# Patient Record
Sex: MALE | Race: WHITE | Employment: OTHER | URBAN - METROPOLITAN AREA
[De-identification: names, ages, dates, MRNs, and addresses within clinical notes are randomized per-mention and may not be internally consistent; named-entity substitution may affect disease eponyms.]

---

## 2017-07-28 ENCOUNTER — HOSPITAL ENCOUNTER (OUTPATIENT)
Dept: PREADMISSION TESTING | Age: 67
Discharge: HOME | End: 2017-07-28
Payer: MEDICARE

## 2017-07-28 VITALS
BODY MASS INDEX: 25.18 KG/M2 | DIASTOLIC BLOOD PRESSURE: 67 MMHG | TEMPERATURE: 96.5 F | HEART RATE: 64 BPM | OXYGEN SATURATION: 96 % | WEIGHT: 170 LBS | HEIGHT: 69 IN | RESPIRATION RATE: 16 BRPM | SYSTOLIC BLOOD PRESSURE: 121 MMHG

## 2017-07-28 LAB
ABO + RH BLD: NORMAL
ALBUMIN SERPL BCP-MCNC: 3.2 G/DL (ref 3.5–5)
ALBUMIN/GLOB SERPL: 1.1 {RATIO} (ref 1.1–2.2)
ALP SERPL-CCNC: 63 U/L (ref 45–117)
ALT SERPL-CCNC: 57 U/L (ref 12–78)
ANION GAP BLD CALC-SCNC: 6 MMOL/L (ref 5–15)
APPEARANCE UR: CLEAR
APTT PPP: 28.2 SEC (ref 22.1–32.5)
AST SERPL W P-5'-P-CCNC: 57 U/L (ref 15–37)
ATRIAL RATE: 54 BPM
BACTERIA URNS QL MICRO: NEGATIVE /HPF
BASOPHILS # BLD AUTO: 0 K/UL (ref 0–0.1)
BASOPHILS # BLD: 0 % (ref 0–1)
BILIRUB SERPL-MCNC: 0.4 MG/DL (ref 0.2–1)
BILIRUB UR QL: NEGATIVE
BLOOD GROUP ANTIBODIES SERPL: NORMAL
BUN SERPL-MCNC: 18 MG/DL (ref 6–20)
BUN/CREAT SERPL: 23 (ref 12–20)
CALCIUM SERPL-MCNC: 8.9 MG/DL (ref 8.5–10.1)
CALCULATED P AXIS, ECG09: 74 DEGREES
CALCULATED R AXIS, ECG10: -8 DEGREES
CALCULATED T AXIS, ECG11: 42 DEGREES
CHLORIDE SERPL-SCNC: 102 MMOL/L (ref 97–108)
CO2 SERPL-SCNC: 30 MMOL/L (ref 21–32)
COLOR UR: ABNORMAL
CREAT SERPL-MCNC: 0.79 MG/DL (ref 0.7–1.3)
DIAGNOSIS, 93000: NORMAL
EOSINOPHIL # BLD: 0.4 K/UL (ref 0–0.4)
EOSINOPHIL NFR BLD: 5 % (ref 0–7)
EPITH CASTS URNS QL MICRO: ABNORMAL /LPF
ERYTHROCYTE [DISTWIDTH] IN BLOOD BY AUTOMATED COUNT: 13.3 % (ref 11.5–14.5)
EST. AVERAGE GLUCOSE BLD GHB EST-MCNC: 108 MG/DL
GLOBULIN SER CALC-MCNC: 2.9 G/DL (ref 2–4)
GLUCOSE SERPL-MCNC: 93 MG/DL (ref 65–100)
GLUCOSE UR STRIP.AUTO-MCNC: NEGATIVE MG/DL
HBA1C MFR BLD: 5.4 % (ref 4.2–6.3)
HCT VFR BLD AUTO: 41.7 % (ref 36.6–50.3)
HGB BLD-MCNC: 14.5 G/DL (ref 12.1–17)
HGB UR QL STRIP: NEGATIVE
HYALINE CASTS URNS QL MICRO: ABNORMAL /LPF (ref 0–5)
INR PPP: 1.1 (ref 0.9–1.1)
KETONES UR QL STRIP.AUTO: NEGATIVE MG/DL
LEUKOCYTE ESTERASE UR QL STRIP.AUTO: NEGATIVE
LYMPHOCYTES # BLD AUTO: 22 % (ref 12–49)
LYMPHOCYTES # BLD: 1.5 K/UL (ref 0.8–3.5)
MCH RBC QN AUTO: 32.2 PG (ref 26–34)
MCHC RBC AUTO-ENTMCNC: 34.8 G/DL (ref 30–36.5)
MCV RBC AUTO: 92.7 FL (ref 80–99)
MONOCYTES # BLD: 0.7 K/UL (ref 0–1)
MONOCYTES NFR BLD AUTO: 11 % (ref 5–13)
NEUTS SEG # BLD: 4.3 K/UL (ref 1.8–8)
NEUTS SEG NFR BLD AUTO: 62 % (ref 32–75)
NITRITE UR QL STRIP.AUTO: NEGATIVE
P-R INTERVAL, ECG05: 224 MS
PH UR STRIP: 7 [PH] (ref 5–8)
PLATELET # BLD AUTO: 217 K/UL (ref 150–400)
POTASSIUM SERPL-SCNC: 4.5 MMOL/L (ref 3.5–5.1)
PROT SERPL-MCNC: 6.1 G/DL (ref 6.4–8.2)
PROT UR STRIP-MCNC: 100 MG/DL
PROTHROMBIN TIME: 10.6 SEC (ref 9–11.1)
Q-T INTERVAL, ECG07: 466 MS
QRS DURATION, ECG06: 100 MS
QTC CALCULATION (BEZET), ECG08: 441 MS
RBC # BLD AUTO: 4.5 M/UL (ref 4.1–5.7)
RBC #/AREA URNS HPF: ABNORMAL /HPF (ref 0–5)
SODIUM SERPL-SCNC: 138 MMOL/L (ref 136–145)
SP GR UR REFRACTOMETRY: 1.01 (ref 1–1.03)
SPECIMEN EXP DATE BLD: NORMAL
THERAPEUTIC RANGE,PTTT: NORMAL SECS (ref 58–77)
UA: UC IF INDICATED,UAUC: ABNORMAL
UROBILINOGEN UR QL STRIP.AUTO: 0.2 EU/DL (ref 0.2–1)
VENTRICULAR RATE, ECG03: 54 BPM
WBC # BLD AUTO: 6.9 K/UL (ref 4.1–11.1)
WBC URNS QL MICRO: ABNORMAL /HPF (ref 0–4)

## 2017-07-28 PROCEDURE — 83036 HEMOGLOBIN GLYCOSYLATED A1C: CPT | Performed by: ORTHOPAEDIC SURGERY

## 2017-07-28 PROCEDURE — 85730 THROMBOPLASTIN TIME PARTIAL: CPT | Performed by: ORTHOPAEDIC SURGERY

## 2017-07-28 PROCEDURE — 86900 BLOOD TYPING SEROLOGIC ABO: CPT | Performed by: ORTHOPAEDIC SURGERY

## 2017-07-28 PROCEDURE — 36415 COLL VENOUS BLD VENIPUNCTURE: CPT | Performed by: ORTHOPAEDIC SURGERY

## 2017-07-28 PROCEDURE — 80053 COMPREHEN METABOLIC PANEL: CPT | Performed by: ORTHOPAEDIC SURGERY

## 2017-07-28 PROCEDURE — 93005 ELECTROCARDIOGRAM TRACING: CPT

## 2017-07-28 PROCEDURE — 85025 COMPLETE CBC W/AUTO DIFF WBC: CPT | Performed by: ORTHOPAEDIC SURGERY

## 2017-07-28 PROCEDURE — 81001 URINALYSIS AUTO W/SCOPE: CPT | Performed by: ORTHOPAEDIC SURGERY

## 2017-07-28 PROCEDURE — 85610 PROTHROMBIN TIME: CPT | Performed by: ORTHOPAEDIC SURGERY

## 2017-07-28 RX ORDER — INDAPAMIDE 1.25 MG/1
TABLET, FILM COATED ORAL
COMMUNITY

## 2017-07-28 RX ORDER — EZETIMIBE 10 MG/1
TABLET ORAL
COMMUNITY

## 2017-07-28 RX ORDER — NEBIVOLOL 5 MG/1
TABLET ORAL
COMMUNITY

## 2017-07-28 NOTE — PERIOP NOTES
[facility]  PREOPERATIVE INSTRUCTIONS    Surgery Date:   8/21/17      Surgery arrival time given by surgeon: NO  (If [facility] staff will call you between 4pm - 8pm the day before surgery with your arrival time. If your surgery is on a Monday, we will call you the preceding Friday. Please call [phone] after 7pm if you did not receive your arrival time.)  1. Report  to the 2nd Floor Admitting Desk at the time you were told the night before surgery. Bring your insurance card, photo identification, and any copayment (if applicable). 2. You must have a responsible adult to drive you home and stay with you the first 24 hours after surgery if you are going home the same day of your surgery. 3. Nothing to eat or drink after midnight the night before surgery. This means NO water, gum, mints, coffee, juice, etc.    4. MEDICATIONS TO TAKE THE MORNING OF SURGERY WITH A SIP OF WATER:Bystolic  5. No alcoholic beverages 24 hours before and after your surgery. 6. If you are being admitted to the hospital,please leave personal belongings/luggage in your car until you have an assigned hospital room number. ( The hospital discharge time is 12 PM NOON. Your adult  should be at the hospital prior to the noon discharge time unless otherwise instructed.)   7. STOP Aspirin and/or any non-steroidal anti-inflammatory drugs (i.e. Ibuprofen, Naproxen, Advil, Aleve) as directed by your surgeon. You may take Tylenol. Stop herbal supplements 1 week prior to  surgery. 8. If you are currently taking Plavix, Coumadin,or any other blood-thinning/ anticoagulant medication contact your surgeon for instructions. 9. Wear comfortable clothes. Wear your glasses instead of contacts. Please leave all money, jewelry and valuables at home. No make up, particularly mascara, the day of surgery. 10.  REMOVE ALL body piercings, rings,and jewelry and leave at home. Wear your hair loose or down, no pony-tails, buns, or any metal hair clips.    11. If you shower the morning of surgery, please do not apply any lotions, powders, or deodorants afterwards. Do not shave any body area within 24 hours of your surgery. 12. Please follow all instructions to avoid any potential surgical cancellation. 13. Should your physical condition change, (i.e. fever, cold, flu, etc.) please notify your surgeon as soon as possible. 14. It is important to be on time. If a situation occurs where you may be delayed, please call:  [phone] /  on the day of surgery. 15. The Preadmission Testing staff can be reached at 21 [phone]. 15.  Special Instructions:  · Use Chlorhexidine Care Fusion wash and sponges 3 days prior to surgery as instructed. · Incentive spirometer given with instructions to practice at home and bring back to the hospital on the day of surgery. · [facility] will contact you if your Hemoglobin A1C is greater than 7.5. · Ensure/Glucerna  sample, nutritional information, and Ensure/Glucerna coupon given. · Pain pamphlet and Call Don't Fall reminder reviewed with patient. ·  parking is complimentary Monday - Friday 7 am - 5 pm  · Bring PTA Medication list day of surgery with the last doses taken documented   · Do not bring medication bottles the day of surgery  · Bring back brace  16. The patient was contacted  in person. He  demonstrate  understanding of all instructions does not  need reinforcement.

## 2017-07-28 NOTE — H&P
PAT Pre-Op History & Physical    Patient: Mo Chen                  MRN: [id number]          SSN: [id number]  YOB: 1950          Age: 75 y.o. Sex: male                Subjective:   Patient is a 75 y.o.  male who presents with history of chronic lower back pain that he states has been a slight problem for \"years\" but escalated significantly about 2 years ago . States at that time he began having pain in his lower back that he thought was related to his hips. Was ultimately referred to another orthopedic spine specialist and received multiple KENDAL without relief. Rates his lower back pain 2-5/10 and states that it radiates down the back of both legs to the level of his calves. Also c/o numbness in his left heel and some weakness in his left leg. Describes his pain as a constant dull ache. In addition to the [facility] SERVICES he has failed NSAIDs, narcotic pain medication, and PT. The patient was evaluated in the surgeon's office and it was determined that the most appropriate plan of care is to proceed with surgical intervention. Patient's PCP Deysi Cano MD        Past Medical History:   Diagnosis Date    Aneurysm ([address]     small AAA (2.7 cm) - followed by Dr. Lee Section Arthritis     Chronic pain     lower back    Gout     Hypertension     Joint pain 9/4/13    Musculoskeletal disorder     Other ill-defined conditions     Inguinal hernia    Other ill-defined conditions     hypercholesterolemia      Past Surgical History:   Procedure Laterality Date    HX HEENT  1986    septoplasty    HX HERNIA REPAIR Right 09/09/2013    inguinal with mesh    HX SEPTOPLASTY  1998    HX VASECTOMY  1989      Prior to Admission medications    Medication Sig Start Date End Date Taking? Authorizing Provider   ezetimibe (ZETIA) 10 mg tablet Take  by mouth daily (after breakfast). Yes Historical Provider   nebivolol (BYSTOLIC) 5 mg tablet Take  by mouth daily (after breakfast).    Yes Historical Provider   indapamide (LOZOL) 1.25 mg tablet Take  by mouth every morning. Yes Historical Provider   atorvastatin (LIPITOR) 40 mg tablet Take  by mouth nightly. Yes Historical Provider   allopurinol (ZYLOPRIM) 300 mg tablet Take  by mouth nightly. Yes Historical Provider   losartan (COZAAR) 100 mg tablet Take 100 mg by mouth daily. AT BEDTIME   Yes Historical Provider   diclofenac EC (VOLTAREN) 75 mg EC tablet Take  by mouth two (2) times daily as needed. Yes Historical Provider   aspirin delayed-release 81 mg tablet Take  by mouth two (2) times a day. Yes Historical Provider     Current Outpatient Prescriptions   Medication Sig    ezetimibe (ZETIA) 10 mg tablet Take  by mouth daily (after breakfast).  nebivolol (BYSTOLIC) 5 mg tablet Take  by mouth daily (after breakfast).  indapamide (LOZOL) 1.25 mg tablet Take  by mouth every morning.  atorvastatin (LIPITOR) 40 mg tablet Take  by mouth nightly.  allopurinol (ZYLOPRIM) 300 mg tablet Take  by mouth nightly.  losartan (COZAAR) 100 mg tablet Take 100 mg by mouth daily. AT BEDTIME    diclofenac EC (VOLTAREN) 75 mg EC tablet Take  by mouth two (2) times daily as needed.  aspirin delayed-release 81 mg tablet Take  by mouth two (2) times a day. No current facility-administered medications for this encounter.        Allergies   Allergen Reactions    Sulfa (Sulfonamide Antibiotics) Hives     Told this by his mother      Social History   Substance Use Topics    Smoking status: Former Smoker     Packs/day: 1.00     Years: 10.00     Quit date: 1986    Smokeless tobacco: Never Used    Alcohol use 1.2 oz/week     2 Glasses of wine per week      History   Drug Use No     Family History   Problem Relation Age of Onset    Cancer Mother      breast, colon    Other Mother      vascular disease- 5 stents- renal/femoral    Heart Surgery Mother      repair congenital heart defect    Hypertension Mother     High Cholesterol Mother  Cancer Father      colon    Heart Disease Father      MI x2 ()    Hypertension Father     Breast Cancer Sister     Hypertension Brother     Diabetes Brother      borderline         Review of Systems    Patient denies difficulty swallowing, mouth sores, or loose teeth. Patient denies any recent dental procedures or any planned prior to surgery. Patient denies chest pain, tightness, pain radiating down left arm, palpitations. Denies dizziness, visual disturbances, or lightheadedness. Patient denies shortness of breath, wheezing, cough, fever, or chills. Patient denies diarrhea, constipation, or abdominal pain. Patient denies urinary problems including dysuria, hesitancy, urgency, or incontinence. Denies skin breakdown, rashes, insect bites or open area. C/o lower back pain. Objective:     Patient Vitals for the past 24 hrs:   Temp Pulse Resp BP SpO2   17 0908 96.5 °F (35.8 °C) 64 16 121/67 96 %     Temp (24hrs), Av.5 °F (35.8 °C), Min:96.5 °F (35.8 °C), Max:96.5 °F (35.8 °C)    Body mass index is 25.1 kg/(m^2). Wt Readings from Last 1 Encounters:   17 77.1 kg (170 lb)        Physical Exam:     General: Pleasant,  cooperative, no apparent distress, appears stated age. Eyes: Conjunctivae/corneas clear. EOMs intact. Nose: Nares normal.   Mouth/Throat: Lips, mucosa, and tongue normal. Teeth and gums normal.   Neck: Supple, symmetrical, trachea midline. Back: Symmetric   Lungs: Clear to auscultation bilaterally. Heart: Regular rate and rhythm, S1, S2 normal. No murmur, click, rub or gallop. Abdomen: Soft, non-tender. Bowel sounds normal. No distention. Musculoskeletal:  Slight LLE weakness noted. Extremities:  Extremities normal, atraumatic, no cyanosis or edema. Calves                                 supple, non tender to palpation. Pulses: 2+ and symmetric bilateral upper extremities. Cap.  refill <2 seconds   Skin: Skin color, texture, turgor normal.  No rashes or lesions. Neurologic: CN II-XII grossly intact. Alert and oriented x3. Labs:   Recent Results (from the past 72 hour(s))   CBC WITH AUTOMATED DIFF    Collection Time: 07/28/17  9:55 AM   Result Value Ref Range    WBC 6.9 4.1 - 11.1 K/uL    RBC 4.50 4. 10 - 5.70 M/uL    HGB 14.5 12.1 - 17.0 g/dL    HCT 41.7 36.6 - 50.3 %    MCV 92.7 80.0 - 99.0 FL    MCH 32.2 26.0 - 34.0 PG    MCHC 34.8 30.0 - 36.5 g/dL    RDW 13.3 11.5 - 14.5 %    PLATELET 020 991 - 278 K/uL    NEUTROPHILS 62 32 - 75 %    LYMPHOCYTES 22 12 - 49 %    MONOCYTES 11 5 - 13 %    EOSINOPHILS 5 0 - 7 %    BASOPHILS 0 0 - 1 %    ABS. NEUTROPHILS 4.3 1.8 - 8.0 K/UL    ABS. LYMPHOCYTES 1.5 0.8 - 3.5 K/UL    ABS. MONOCYTES 0.7 0.0 - 1.0 K/UL    ABS. EOSINOPHILS 0.4 0.0 - 0.4 K/UL    ABS. BASOPHILS 0.0 0.0 - 0.1 K/UL   METABOLIC PANEL, COMPREHENSIVE    Collection Time: 07/28/17  9:55 AM   Result Value Ref Range    Sodium 138 136 - 145 mmol/L    Potassium 4.5 3.5 - 5.1 mmol/L    Chloride 102 97 - 108 mmol/L    CO2 30 21 - 32 mmol/L    Anion gap 6 5 - 15 mmol/L    Glucose 93 65 - 100 mg/dL    BUN 18 6 - 20 MG/DL    Creatinine 0.79 0.70 - 1.30 MG/DL    BUN/Creatinine ratio 23 (H) 12 - 20      GFR est AA >60 >60 ml/min/1.73m2    GFR est non-AA >60 >60 ml/min/1.73m2    Calcium 8.9 8.5 - 10.1 MG/DL    Bilirubin, total 0.4 0.2 - 1.0 MG/DL    ALT (SGPT) 57 12 - 78 U/L    AST (SGOT) 57 (H) 15 - 37 U/L    Alk.  phosphatase 63 45 - 117 U/L    Protein, total 6.1 (L) 6.4 - 8.2 g/dL    Albumin 3.2 (L) 3.5 - 5.0 g/dL    Globulin 2.9 2.0 - 4.0 g/dL    A-G Ratio 1.1 1.1 - 2.2     HEMOGLOBIN A1C WITH EAG    Collection Time: 07/28/17  9:55 AM   Result Value Ref Range    Hemoglobin A1c 5.4 4.2 - 6.3 %    Est. average glucose 108 mg/dL   PROTHROMBIN TIME + INR    Collection Time: 07/28/17  9:55 AM   Result Value Ref Range    INR 1.1 0.9 - 1.1      Prothrombin time 10.6 9.0 - 11.1 sec   PTT    Collection Time: 07/28/17  9:55 AM   Result Value Ref Range    aPTT 28.2 22.1 - 32.5 sec    aPTT, therapeutic range     58.0 - 77.0 SECS   URINALYSIS W/ REFLEX CULTURE    Collection Time: 07/28/17  9:55 AM   Result Value Ref Range    Color YELLOW/STRAW      Appearance CLEAR CLEAR      Specific gravity 1.010 1.003 - 1.030      pH (UA) 7.0 5.0 - 8.0      Protein 100 (A) NEG mg/dL    Glucose NEGATIVE  NEG mg/dL    Ketone NEGATIVE  NEG mg/dL    Bilirubin NEGATIVE  NEG      Blood NEGATIVE  NEG      Urobilinogen 0.2 0.2 - 1.0 EU/dL    Nitrites NEGATIVE  NEG      Leukocyte Esterase NEGATIVE  NEG      WBC 0-4 0 - 4 /hpf    RBC 0-5 0 - 5 /hpf    Epithelial cells FEW FEW /lpf    Bacteria NEGATIVE  NEG /hpf    UA:UC IF INDICATED CULTURE NOT INDICATED BY UA RESULT CNI      Hyaline cast 0-2 0 - 5 /lpf   TYPE & SCREEN    Collection Time: 07/28/17  9:55 AM   Result Value Ref Range    Crossmatch Expiration 07/31/2017     ABO/Rh(D) O NEGATIVE     Antibody screen NEG    EKG, 12 LEAD, INITIAL    Collection Time: 07/28/17 10:46 AM   Result Value Ref Range    Ventricular Rate 54 BPM    Atrial Rate 54 BPM    P-R Interval 224 ms    QRS Duration 100 ms    Q-T Interval 466 ms    QTC Calculation (Bezet) 441 ms    Calculated P Axis 74 degrees    Calculated R Axis -8 degrees    Calculated T Axis 42 degrees    Diagnosis       Sinus bradycardia with 1st degree AV block  Otherwise normal ECG  When compared with ECG of 09-SEP-2013 10:56,  No significant change was found  Confirmed by Kyra Garcia MD., Deandra Ordaz ([id number]) on 7/28/2017 3:15:19 PM         Assessment:     Lumbar stenosis    Plan:     Scheduled for L3- L4 laminectomy, fusion, instrumentation, TLIF, bone graft. Labs and EKG done per surgeon's orders. LAb results and EKG reviewed- unremarkable. MRSA pending. Copies of last office notes received from PCP (Dr. Efren Tracey), Cardiology (Dr. Otilio Phipps), and Vascular ( Dr. Melissa Corea)- reviewed and placed on chart.     Dina Jiang NP

## 2017-07-29 LAB
BACTERIA SPEC CULT: NORMAL
BACTERIA SPEC CULT: NORMAL
SERVICE CMNT-IMP: NORMAL

## 2017-08-18 ENCOUNTER — ANESTHESIA EVENT (OUTPATIENT)
Dept: SURGERY | Age: 67
DRG: 460 | End: 2017-08-18
Payer: MEDICARE

## 2017-08-21 ENCOUNTER — APPOINTMENT (OUTPATIENT)
Dept: GENERAL RADIOLOGY | Age: 67
DRG: 460 | End: 2017-08-21
Attending: ORTHOPAEDIC SURGERY
Payer: MEDICARE

## 2017-08-21 ENCOUNTER — HOSPITAL ENCOUNTER (INPATIENT)
Age: 67
LOS: 4 days | Discharge: HOME HEALTH | DRG: 460 | End: 2017-08-25
Attending: ORTHOPAEDIC SURGERY | Admitting: ORTHOPAEDIC SURGERY
Payer: MEDICARE

## 2017-08-21 ENCOUNTER — ANESTHESIA (OUTPATIENT)
Dept: SURGERY | Age: 67
DRG: 460 | End: 2017-08-21
Payer: MEDICARE

## 2017-08-21 PROBLEM — M48.062 LUMBAR STENOSIS WITH NEUROGENIC CLAUDICATION: Status: ACTIVE | Noted: 2017-08-21

## 2017-08-21 LAB
ABO + RH BLD: NORMAL
BLOOD GROUP ANTIBODIES SERPL: NORMAL
SPECIMEN EXP DATE BLD: NORMAL

## 2017-08-21 PROCEDURE — 77030008771 HC TU NG SALEM SUMP -A: Performed by: NURSE ANESTHETIST, CERTIFIED REGISTERED

## 2017-08-21 PROCEDURE — 36415 COLL VENOUS BLD VENIPUNCTURE: CPT | Performed by: ORTHOPAEDIC SURGERY

## 2017-08-21 PROCEDURE — C1713 ANCHOR/SCREW BN/BN,TIS/BN: HCPCS | Performed by: ORTHOPAEDIC SURGERY

## 2017-08-21 PROCEDURE — 74011250636 HC RX REV CODE- 250/636: Performed by: ANESTHESIOLOGY

## 2017-08-21 PROCEDURE — 01NB0ZZ RELEASE LUMBAR NERVE, OPEN APPROACH: ICD-10-PCS | Performed by: ORTHOPAEDIC SURGERY

## 2017-08-21 PROCEDURE — 74011250636 HC RX REV CODE- 250/636

## 2017-08-21 PROCEDURE — 77030003161 HC GRFT DURA MTRX INLC -E: Performed by: ORTHOPAEDIC SURGERY

## 2017-08-21 PROCEDURE — 77030037134 HC WRAP COMPR BACK THER SOLM -B

## 2017-08-21 PROCEDURE — 77030034850: Performed by: ORTHOPAEDIC SURGERY

## 2017-08-21 PROCEDURE — 65270000029 HC RM PRIVATE

## 2017-08-21 PROCEDURE — 77030012890

## 2017-08-21 PROCEDURE — 77030029099 HC BN WAX SSPC -A: Performed by: ORTHOPAEDIC SURGERY

## 2017-08-21 PROCEDURE — 76000 FLUOROSCOPY <1 HR PHYS/QHP: CPT

## 2017-08-21 PROCEDURE — 77030031139 HC SUT VCRL2 J&J -A: Performed by: ORTHOPAEDIC SURGERY

## 2017-08-21 PROCEDURE — 77030034274 HC GRFT BN CHIP ALLGRFT 10CC REGE -I: Performed by: ORTHOPAEDIC SURGERY

## 2017-08-21 PROCEDURE — 77030012406 HC DRN WND PENRS BARD -A: Performed by: ORTHOPAEDIC SURGERY

## 2017-08-21 PROCEDURE — 77030018723 HC ELCTRD BLD COVD -A: Performed by: ORTHOPAEDIC SURGERY

## 2017-08-21 PROCEDURE — 4A11X4G MONITORING OF PERIPHERAL NERVOUS ELECTRICAL ACTIVITY, INTRAOPERATIVE, EXTERNAL APPROACH: ICD-10-PCS | Performed by: ORTHOPAEDIC SURGERY

## 2017-08-21 PROCEDURE — 76010000173 HC OR TIME 3 TO 3.5 HR INTENSV-TIER 1: Performed by: ORTHOPAEDIC SURGERY

## 2017-08-21 PROCEDURE — 77030003666 HC NDL SPINAL BD -A: Performed by: ORTHOPAEDIC SURGERY

## 2017-08-21 PROCEDURE — 77030004391 HC BUR FLUT MEDT -C: Performed by: ORTHOPAEDIC SURGERY

## 2017-08-21 PROCEDURE — 77030002933 HC SUT MCRYL J&J -A: Performed by: ORTHOPAEDIC SURGERY

## 2017-08-21 PROCEDURE — 77030019908 HC STETH ESOPH SIMS -A: Performed by: NURSE ANESTHETIST, CERTIFIED REGISTERED

## 2017-08-21 PROCEDURE — 77030037202 HC SPCR SPN BULL TIP PEK VBR IBF REGE -I1: Performed by: ORTHOPAEDIC SURGERY

## 2017-08-21 PROCEDURE — 77030018719 HC DRSG PTCH ANTIMIC J&J -A: Performed by: ORTHOPAEDIC SURGERY

## 2017-08-21 PROCEDURE — 77030018836 HC SOL IRR NACL ICUM -A: Performed by: ORTHOPAEDIC SURGERY

## 2017-08-21 PROCEDURE — 74011000250 HC RX REV CODE- 250

## 2017-08-21 PROCEDURE — 77030033067 HC SUT PDO STRATFX SPIR J&J -B: Performed by: ORTHOPAEDIC SURGERY

## 2017-08-21 PROCEDURE — 86900 BLOOD TYPING SEROLOGIC ABO: CPT | Performed by: ORTHOPAEDIC SURGERY

## 2017-08-21 PROCEDURE — 74011250637 HC RX REV CODE- 250/637: Performed by: ORTHOPAEDIC SURGERY

## 2017-08-21 PROCEDURE — 76210000016 HC OR PH I REC 1 TO 1.5 HR: Performed by: ORTHOPAEDIC SURGERY

## 2017-08-21 PROCEDURE — 77030026188 HC BN CANC CHP CRSH PR LIFV -E: Performed by: ORTHOPAEDIC SURGERY

## 2017-08-21 PROCEDURE — 77030034479 HC ADH SKN CLSR PRINEO J&J -B: Performed by: ORTHOPAEDIC SURGERY

## 2017-08-21 PROCEDURE — 0SB20ZZ EXCISION OF LUMBAR VERTEBRAL DISC, OPEN APPROACH: ICD-10-PCS | Performed by: ORTHOPAEDIC SURGERY

## 2017-08-21 PROCEDURE — 77030018846 HC SOL IRR STRL H20 ICUM -A: Performed by: ORTHOPAEDIC SURGERY

## 2017-08-21 PROCEDURE — 77030026438 HC STYL ET INTUB CARD -A: Performed by: NURSE ANESTHETIST, CERTIFIED REGISTERED

## 2017-08-21 PROCEDURE — 0SG00AJ FUSION OF LUMBAR VERTEBRAL JOINT WITH INTERBODY FUSION DEVICE, POSTERIOR APPROACH, ANTERIOR COLUMN, OPEN APPROACH: ICD-10-PCS | Performed by: ORTHOPAEDIC SURGERY

## 2017-08-21 PROCEDURE — 74011000272 HC RX REV CODE- 272: Performed by: ORTHOPAEDIC SURGERY

## 2017-08-21 PROCEDURE — 77030013079 HC BLNKT BAIR HGGR 3M -A: Performed by: NURSE ANESTHETIST, CERTIFIED REGISTERED

## 2017-08-21 PROCEDURE — 77030032490 HC SLV COMPR SCD KNE COVD -B: Performed by: ORTHOPAEDIC SURGERY

## 2017-08-21 PROCEDURE — 74011250636 HC RX REV CODE- 250/636: Performed by: ORTHOPAEDIC SURGERY

## 2017-08-21 PROCEDURE — 76060000037 HC ANESTHESIA 3 TO 3.5 HR: Performed by: ORTHOPAEDIC SURGERY

## 2017-08-21 PROCEDURE — 74011000250 HC RX REV CODE- 250: Performed by: ORTHOPAEDIC SURGERY

## 2017-08-21 PROCEDURE — 77030030107 HC BLD BN MILL DISP STRY -C: Performed by: ORTHOPAEDIC SURGERY

## 2017-08-21 PROCEDURE — 77030008684 HC TU ET CUF COVD -B: Performed by: NURSE ANESTHETIST, CERTIFIED REGISTERED

## 2017-08-21 PROCEDURE — 77030020782 HC GWN BAIR PAWS FLX 3M -B

## 2017-08-21 DEVICE — SCR BNE SPNE 6.5X50MM TI -- STREAMLINE TL: Type: IMPLANTABLE DEVICE | Site: SPINE LUMBAR | Status: FUNCTIONAL

## 2017-08-21 DEVICE — 5.5MM CURVED ROD 45MM TI ALLOY
Type: IMPLANTABLE DEVICE | Site: SPINE LUMBAR | Status: FUNCTIONAL
Brand: TAURUS

## 2017-08-21 DEVICE — GRAFT BNE 30CC 1 4MM CANC CRUSH CHIP READIGRFT: Type: IMPLANTABLE DEVICE | Site: SPINE LUMBAR | Status: FUNCTIONAL

## 2017-08-21 DEVICE — SPACER SPNL 12X22 MM BULL TIP IF PEEK VBR: Type: IMPLANTABLE DEVICE | Site: SPINE LUMBAR | Status: FUNCTIONAL

## 2017-08-21 DEVICE — GRAFT BNE SUB 10CC CORT CANC DBM CRYOGENICALLY PRESERVED: Type: IMPLANTABLE DEVICE | Site: SPINE LUMBAR | Status: FUNCTIONAL

## 2017-08-21 DEVICE — SCR SET SPNE STREAMLINE TL --: Type: IMPLANTABLE DEVICE | Site: SPINE LUMBAR | Status: FUNCTIONAL

## 2017-08-21 DEVICE — DURAGEN® SUTURABLE DURAL REGENERATION MATRIX, 2 IN X 2 IN (5 CM X 5 CM)
Type: IMPLANTABLE DEVICE | Site: SPINE LUMBAR | Status: FUNCTIONAL
Brand: DURAGEN® SUTURABLE

## 2017-08-21 RX ORDER — SODIUM CHLORIDE 0.9 % (FLUSH) 0.9 %
5-10 SYRINGE (ML) INJECTION AS NEEDED
Status: DISCONTINUED | OUTPATIENT
Start: 2017-08-21 | End: 2017-08-21 | Stop reason: HOSPADM

## 2017-08-21 RX ORDER — PROPOFOL 10 MG/ML
INJECTION, EMULSION INTRAVENOUS AS NEEDED
Status: DISCONTINUED | OUTPATIENT
Start: 2017-08-21 | End: 2017-08-21 | Stop reason: HOSPADM

## 2017-08-21 RX ORDER — MIDAZOLAM HYDROCHLORIDE 1 MG/ML
INJECTION, SOLUTION INTRAMUSCULAR; INTRAVENOUS AS NEEDED
Status: DISCONTINUED | OUTPATIENT
Start: 2017-08-21 | End: 2017-08-21 | Stop reason: HOSPADM

## 2017-08-21 RX ORDER — SODIUM CHLORIDE 0.9 % (FLUSH) 0.9 %
5-10 SYRINGE (ML) INJECTION AS NEEDED
Status: DISCONTINUED | OUTPATIENT
Start: 2017-08-21 | End: 2017-08-25 | Stop reason: HOSPADM

## 2017-08-21 RX ORDER — PROPOFOL 10 MG/ML
INJECTION, EMULSION INTRAVENOUS
Status: DISCONTINUED | OUTPATIENT
Start: 2017-08-21 | End: 2017-08-21 | Stop reason: HOSPADM

## 2017-08-21 RX ORDER — SUCCINYLCHOLINE CHLORIDE 20 MG/ML
INJECTION INTRAMUSCULAR; INTRAVENOUS AS NEEDED
Status: DISCONTINUED | OUTPATIENT
Start: 2017-08-21 | End: 2017-08-21 | Stop reason: HOSPADM

## 2017-08-21 RX ORDER — DIPHENHYDRAMINE HCL 25 MG
25 CAPSULE ORAL
Status: DISCONTINUED | OUTPATIENT
Start: 2017-08-21 | End: 2017-08-25 | Stop reason: HOSPADM

## 2017-08-21 RX ORDER — HYDROMORPHONE HYDROCHLORIDE 1 MG/ML
1 INJECTION, SOLUTION INTRAMUSCULAR; INTRAVENOUS; SUBCUTANEOUS
Status: DISCONTINUED | OUTPATIENT
Start: 2017-08-21 | End: 2017-08-22

## 2017-08-21 RX ORDER — NEBIVOLOL 5 MG/1
5 TABLET ORAL DAILY
Status: DISCONTINUED | OUTPATIENT
Start: 2017-08-22 | End: 2017-08-25 | Stop reason: HOSPADM

## 2017-08-21 RX ORDER — GLYCOPYRROLATE 0.2 MG/ML
INJECTION INTRAMUSCULAR; INTRAVENOUS AS NEEDED
Status: DISCONTINUED | OUTPATIENT
Start: 2017-08-21 | End: 2017-08-21 | Stop reason: HOSPADM

## 2017-08-21 RX ORDER — SODIUM CHLORIDE 0.9 % (FLUSH) 0.9 %
5-10 SYRINGE (ML) INJECTION EVERY 8 HOURS
Status: DISCONTINUED | OUTPATIENT
Start: 2017-08-21 | End: 2017-08-21 | Stop reason: HOSPADM

## 2017-08-21 RX ORDER — LIDOCAINE HYDROCHLORIDE 20 MG/ML
INJECTION, SOLUTION EPIDURAL; INFILTRATION; INTRACAUDAL; PERINEURAL AS NEEDED
Status: DISCONTINUED | OUTPATIENT
Start: 2017-08-21 | End: 2017-08-21 | Stop reason: HOSPADM

## 2017-08-21 RX ORDER — ACETAMINOPHEN 325 MG/1
650 TABLET ORAL
Status: DISCONTINUED | OUTPATIENT
Start: 2017-08-21 | End: 2017-08-25 | Stop reason: HOSPADM

## 2017-08-21 RX ORDER — ONDANSETRON 2 MG/ML
4 INJECTION INTRAMUSCULAR; INTRAVENOUS
Status: DISCONTINUED | OUTPATIENT
Start: 2017-08-21 | End: 2017-08-25 | Stop reason: HOSPADM

## 2017-08-21 RX ORDER — HYDROMORPHONE HCL IN 0.9% NACL 15 MG/30ML
PATIENT CONTROLLED ANALGESIA VIAL INTRAVENOUS
Status: DISCONTINUED | OUTPATIENT
Start: 2017-08-21 | End: 2017-08-22

## 2017-08-21 RX ORDER — LIDOCAINE HYDROCHLORIDE 10 MG/ML
0.1 INJECTION, SOLUTION EPIDURAL; INFILTRATION; INTRACAUDAL; PERINEURAL AS NEEDED
Status: DISCONTINUED | OUTPATIENT
Start: 2017-08-21 | End: 2017-08-21 | Stop reason: HOSPADM

## 2017-08-21 RX ORDER — SODIUM CHLORIDE, SODIUM LACTATE, POTASSIUM CHLORIDE, CALCIUM CHLORIDE 600; 310; 30; 20 MG/100ML; MG/100ML; MG/100ML; MG/100ML
100 INJECTION, SOLUTION INTRAVENOUS CONTINUOUS
Status: DISCONTINUED | OUTPATIENT
Start: 2017-08-21 | End: 2017-08-21 | Stop reason: HOSPADM

## 2017-08-21 RX ORDER — SODIUM CHLORIDE 9 MG/ML
125 INJECTION, SOLUTION INTRAVENOUS CONTINUOUS
Status: DISCONTINUED | OUTPATIENT
Start: 2017-08-21 | End: 2017-08-25 | Stop reason: HOSPADM

## 2017-08-21 RX ORDER — INDAPAMIDE 2.5 MG/1
1.25 TABLET, FILM COATED ORAL
Status: DISCONTINUED | OUTPATIENT
Start: 2017-08-22 | End: 2017-08-25 | Stop reason: HOSPADM

## 2017-08-21 RX ORDER — ONDANSETRON 2 MG/ML
INJECTION INTRAMUSCULAR; INTRAVENOUS AS NEEDED
Status: DISCONTINUED | OUTPATIENT
Start: 2017-08-21 | End: 2017-08-21 | Stop reason: HOSPADM

## 2017-08-21 RX ORDER — FENTANYL CITRATE 50 UG/ML
INJECTION, SOLUTION INTRAMUSCULAR; INTRAVENOUS AS NEEDED
Status: DISCONTINUED | OUTPATIENT
Start: 2017-08-21 | End: 2017-08-21 | Stop reason: HOSPADM

## 2017-08-21 RX ORDER — FAMOTIDINE 20 MG/1
20 TABLET, FILM COATED ORAL EVERY 12 HOURS
Status: DISCONTINUED | OUTPATIENT
Start: 2017-08-21 | End: 2017-08-25 | Stop reason: HOSPADM

## 2017-08-21 RX ORDER — DIAZEPAM 5 MG/1
5 TABLET ORAL
Status: DISCONTINUED | OUTPATIENT
Start: 2017-08-21 | End: 2017-08-25 | Stop reason: HOSPADM

## 2017-08-21 RX ORDER — SODIUM CHLORIDE 0.9 % (FLUSH) 0.9 %
5-10 SYRINGE (ML) INJECTION EVERY 8 HOURS
Status: DISCONTINUED | OUTPATIENT
Start: 2017-08-21 | End: 2017-08-25 | Stop reason: HOSPADM

## 2017-08-21 RX ORDER — DIPHENHYDRAMINE HYDROCHLORIDE 50 MG/ML
12.5 INJECTION, SOLUTION INTRAMUSCULAR; INTRAVENOUS AS NEEDED
Status: DISCONTINUED | OUTPATIENT
Start: 2017-08-21 | End: 2017-08-21 | Stop reason: HOSPADM

## 2017-08-21 RX ORDER — LOSARTAN POTASSIUM 50 MG/1
100 TABLET ORAL
Status: DISCONTINUED | OUTPATIENT
Start: 2017-08-21 | End: 2017-08-25 | Stop reason: HOSPADM

## 2017-08-21 RX ORDER — CEFAZOLIN SODIUM IN 0.9 % NACL 2 G/50 ML
2 INTRAVENOUS SOLUTION, PIGGYBACK (ML) INTRAVENOUS ONCE
Status: COMPLETED | OUTPATIENT
Start: 2017-08-21 | End: 2017-08-21

## 2017-08-21 RX ORDER — SODIUM CHLORIDE, SODIUM LACTATE, POTASSIUM CHLORIDE, CALCIUM CHLORIDE 600; 310; 30; 20 MG/100ML; MG/100ML; MG/100ML; MG/100ML
125 INJECTION, SOLUTION INTRAVENOUS CONTINUOUS
Status: DISCONTINUED | OUTPATIENT
Start: 2017-08-21 | End: 2017-08-21 | Stop reason: HOSPADM

## 2017-08-21 RX ORDER — DEXAMETHASONE SODIUM PHOSPHATE 4 MG/ML
INJECTION, SOLUTION INTRA-ARTICULAR; INTRALESIONAL; INTRAMUSCULAR; INTRAVENOUS; SOFT TISSUE AS NEEDED
Status: DISCONTINUED | OUTPATIENT
Start: 2017-08-21 | End: 2017-08-21 | Stop reason: HOSPADM

## 2017-08-21 RX ORDER — NALOXONE HYDROCHLORIDE 0.4 MG/ML
0.4 INJECTION, SOLUTION INTRAMUSCULAR; INTRAVENOUS; SUBCUTANEOUS AS NEEDED
Status: DISCONTINUED | OUTPATIENT
Start: 2017-08-21 | End: 2017-08-25 | Stop reason: HOSPADM

## 2017-08-21 RX ORDER — ROCURONIUM BROMIDE 10 MG/ML
INJECTION, SOLUTION INTRAVENOUS AS NEEDED
Status: DISCONTINUED | OUTPATIENT
Start: 2017-08-21 | End: 2017-08-21 | Stop reason: HOSPADM

## 2017-08-21 RX ORDER — ONDANSETRON 2 MG/ML
4 INJECTION INTRAMUSCULAR; INTRAVENOUS AS NEEDED
Status: DISCONTINUED | OUTPATIENT
Start: 2017-08-21 | End: 2017-08-21 | Stop reason: HOSPADM

## 2017-08-21 RX ORDER — OXYCODONE AND ACETAMINOPHEN 10; 325 MG/1; MG/1
1 TABLET ORAL
Status: DISCONTINUED | OUTPATIENT
Start: 2017-08-21 | End: 2017-08-22

## 2017-08-21 RX ORDER — ZOLPIDEM TARTRATE 5 MG/1
5 TABLET ORAL
Status: DISCONTINUED | OUTPATIENT
Start: 2017-08-21 | End: 2017-08-25 | Stop reason: HOSPADM

## 2017-08-21 RX ORDER — ATORVASTATIN CALCIUM 10 MG/1
40 TABLET, FILM COATED ORAL
Status: DISCONTINUED | OUTPATIENT
Start: 2017-08-21 | End: 2017-08-25 | Stop reason: HOSPADM

## 2017-08-21 RX ORDER — VANCOMYCIN HYDROCHLORIDE 1 G/20ML
INJECTION, POWDER, LYOPHILIZED, FOR SOLUTION INTRAVENOUS AS NEEDED
Status: DISCONTINUED | OUTPATIENT
Start: 2017-08-21 | End: 2017-08-21 | Stop reason: HOSPADM

## 2017-08-21 RX ORDER — EZETIMIBE 10 MG/1
10 TABLET ORAL
Status: DISCONTINUED | OUTPATIENT
Start: 2017-08-22 | End: 2017-08-25 | Stop reason: HOSPADM

## 2017-08-21 RX ORDER — DOCUSATE SODIUM 100 MG/1
100 CAPSULE, LIQUID FILLED ORAL 2 TIMES DAILY
Status: DISCONTINUED | OUTPATIENT
Start: 2017-08-21 | End: 2017-08-25 | Stop reason: HOSPADM

## 2017-08-21 RX ORDER — HYDROMORPHONE HYDROCHLORIDE 1 MG/ML
.25-1 INJECTION, SOLUTION INTRAMUSCULAR; INTRAVENOUS; SUBCUTANEOUS
Status: DISCONTINUED | OUTPATIENT
Start: 2017-08-21 | End: 2017-08-21 | Stop reason: HOSPADM

## 2017-08-21 RX ORDER — CEFAZOLIN SODIUM IN 0.9 % NACL 2 G/50 ML
2 INTRAVENOUS SOLUTION, PIGGYBACK (ML) INTRAVENOUS EVERY 8 HOURS
Status: DISCONTINUED | OUTPATIENT
Start: 2017-08-21 | End: 2017-08-22

## 2017-08-21 RX ORDER — ALLOPURINOL 300 MG/1
300 TABLET ORAL
Status: DISCONTINUED | OUTPATIENT
Start: 2017-08-21 | End: 2017-08-25 | Stop reason: HOSPADM

## 2017-08-21 RX ADMIN — FAMOTIDINE 20 MG: 20 TABLET, FILM COATED ORAL at 21:13

## 2017-08-21 RX ADMIN — SODIUM CHLORIDE 125 ML/HR: 900 INJECTION, SOLUTION INTRAVENOUS at 23:06

## 2017-08-21 RX ADMIN — FENTANYL CITRATE 50 MCG: 50 INJECTION, SOLUTION INTRAMUSCULAR; INTRAVENOUS at 11:36

## 2017-08-21 RX ADMIN — PROPOFOL 100 MCG/KG/MIN: 10 INJECTION, EMULSION INTRAVENOUS at 11:50

## 2017-08-21 RX ADMIN — PROPOFOL 170 MG: 10 INJECTION, EMULSION INTRAVENOUS at 11:42

## 2017-08-21 RX ADMIN — FENTANYL CITRATE 100 MCG: 50 INJECTION, SOLUTION INTRAMUSCULAR; INTRAVENOUS at 12:25

## 2017-08-21 RX ADMIN — LOSARTAN POTASSIUM 100 MG: 50 TABLET, FILM COATED ORAL at 21:14

## 2017-08-21 RX ADMIN — ALLOPURINOL 300 MG: 300 TABLET ORAL at 21:13

## 2017-08-21 RX ADMIN — LIDOCAINE HYDROCHLORIDE 60 MG: 20 INJECTION, SOLUTION EPIDURAL; INFILTRATION; INTRACAUDAL; PERINEURAL at 11:42

## 2017-08-21 RX ADMIN — SUCCINYLCHOLINE CHLORIDE 100 MG: 20 INJECTION INTRAMUSCULAR; INTRAVENOUS at 11:42

## 2017-08-21 RX ADMIN — GLYCOPYRROLATE 0.2 MG: 0.2 INJECTION INTRAMUSCULAR; INTRAVENOUS at 11:53

## 2017-08-21 RX ADMIN — FENTANYL CITRATE 50 MCG: 50 INJECTION, SOLUTION INTRAMUSCULAR; INTRAVENOUS at 11:42

## 2017-08-21 RX ADMIN — CEFAZOLIN 2 G: 1 INJECTION, POWDER, FOR SOLUTION INTRAMUSCULAR; INTRAVENOUS; PARENTERAL at 12:07

## 2017-08-21 RX ADMIN — CEFAZOLIN 2 G: 1 INJECTION, POWDER, FOR SOLUTION INTRAMUSCULAR; INTRAVENOUS; PARENTERAL at 17:58

## 2017-08-21 RX ADMIN — FENTANYL CITRATE 50 MCG: 50 INJECTION, SOLUTION INTRAMUSCULAR; INTRAVENOUS at 14:10

## 2017-08-21 RX ADMIN — FENTANYL CITRATE 50 MCG: 50 INJECTION, SOLUTION INTRAMUSCULAR; INTRAVENOUS at 14:58

## 2017-08-21 RX ADMIN — SODIUM CHLORIDE, SODIUM LACTATE, POTASSIUM CHLORIDE, AND CALCIUM CHLORIDE: 600; 310; 30; 20 INJECTION, SOLUTION INTRAVENOUS at 14:16

## 2017-08-21 RX ADMIN — FENTANYL CITRATE 50 MCG: 50 INJECTION, SOLUTION INTRAMUSCULAR; INTRAVENOUS at 11:47

## 2017-08-21 RX ADMIN — ATORVASTATIN CALCIUM 40 MG: 10 TABLET, FILM COATED ORAL at 21:14

## 2017-08-21 RX ADMIN — DOCUSATE SODIUM 100 MG: 100 CAPSULE ORAL at 17:55

## 2017-08-21 RX ADMIN — FENTANYL CITRATE 50 MCG: 50 INJECTION, SOLUTION INTRAMUSCULAR; INTRAVENOUS at 11:41

## 2017-08-21 RX ADMIN — ROCURONIUM BROMIDE 10 MG: 10 INJECTION, SOLUTION INTRAVENOUS at 11:42

## 2017-08-21 RX ADMIN — DEXAMETHASONE SODIUM PHOSPHATE 8 MG: 4 INJECTION, SOLUTION INTRA-ARTICULAR; INTRALESIONAL; INTRAMUSCULAR; INTRAVENOUS; SOFT TISSUE at 12:03

## 2017-08-21 RX ADMIN — MIDAZOLAM HYDROCHLORIDE 5 MG: 1 INJECTION, SOLUTION INTRAMUSCULAR; INTRAVENOUS at 11:34

## 2017-08-21 RX ADMIN — Medication: at 19:25

## 2017-08-21 RX ADMIN — FENTANYL CITRATE 50 MCG: 50 INJECTION, SOLUTION INTRAMUSCULAR; INTRAVENOUS at 14:53

## 2017-08-21 RX ADMIN — ONDANSETRON 4 MG: 2 INJECTION INTRAMUSCULAR; INTRAVENOUS at 16:57

## 2017-08-21 RX ADMIN — ROCURONIUM BROMIDE 20 MG: 10 INJECTION, SOLUTION INTRAVENOUS at 12:28

## 2017-08-21 RX ADMIN — Medication: at 15:11

## 2017-08-21 RX ADMIN — Medication 10 ML: at 21:14

## 2017-08-21 RX ADMIN — ONDANSETRON 4 MG: 2 INJECTION INTRAMUSCULAR; INTRAVENOUS at 12:03

## 2017-08-21 RX ADMIN — SODIUM CHLORIDE 125 ML/HR: 900 INJECTION, SOLUTION INTRAVENOUS at 15:10

## 2017-08-21 RX ADMIN — SODIUM CHLORIDE, SODIUM LACTATE, POTASSIUM CHLORIDE, AND CALCIUM CHLORIDE 100 ML/HR: 600; 310; 30; 20 INJECTION, SOLUTION INTRAVENOUS at 10:10

## 2017-08-21 RX ADMIN — SODIUM CHLORIDE, SODIUM LACTATE, POTASSIUM CHLORIDE, AND CALCIUM CHLORIDE: 600; 310; 30; 20 INJECTION, SOLUTION INTRAVENOUS at 12:46

## 2017-08-21 RX ADMIN — ONDANSETRON 4 MG: 2 INJECTION INTRAMUSCULAR; INTRAVENOUS at 20:25

## 2017-08-21 RX ADMIN — FENTANYL CITRATE 50 MCG: 50 INJECTION, SOLUTION INTRAMUSCULAR; INTRAVENOUS at 11:49

## 2017-08-21 NOTE — ANESTHESIA PREPROCEDURE EVALUATION
Anesthetic History   No history of anesthetic complications            Review of Systems / Medical History  Patient summary reviewed, nursing notes reviewed and pertinent labs reviewed    Pulmonary  Within defined limits                 Neuro/Psych             Comments: PAIN = 0/10  Bilateral hips to posterior legs to knees Cardiovascular    Hypertension: well controlled          Hyperlipidemia    Exercise tolerance: >4 METS  Comments: Small AAA 2.7 cm   GI/Hepatic/Renal  Within defined limits              Endo/Other        Arthritis     Other Findings              Physical Exam    Airway  Mallampati: I    Neck ROM: normal range of motion   Mouth opening: Normal     Cardiovascular    Rhythm: regular  Rate: normal         Dental  No notable dental hx       Pulmonary  Breath sounds clear to auscultation               Abdominal         Other Findings            Anesthetic Plan    ASA: 2  Anesthesia type: general          Induction: Intravenous  Anesthetic plan and risks discussed with: Patient      Informed consent obtained.

## 2017-08-21 NOTE — PROGRESS NOTES
Primary Nurse Iggy Shepard, RN and Sherren Hardy, RN, RN performed a dual skin assessment on this patient No impairment noted apart from lumbar surgical incision  Joseph score is 18

## 2017-08-21 NOTE — ROUTINE PROCESS
TRANSFER - OUT REPORT:    Verbal report given to Catalina Clark RN on Angela Conde  being transferred to [facility] for routine post - op       Report consisted of patients Situation, Background, Assessment and   Recommendations(SBAR). Information from the following report(s) SBAR and OR Summary was reviewed with the receiving nurse. Lines:   Peripheral IV 08/21/17 Left Forearm (Active)   Site Assessment Clean, dry, & intact 8/21/2017  3:10 PM   Phlebitis Assessment 0 8/21/2017  3:10 PM   Infiltration Assessment 0 8/21/2017  3:10 PM   Dressing Status Clean, dry, & intact 8/21/2017  3:10 PM   Dressing Type Tape;Transparent 8/21/2017  3:10 PM   Hub Color/Line Status Pink 8/21/2017  3:10 PM   Alcohol Cap Used Yes 8/21/2017  3:10 PM        Opportunity for questions and clarification was provided.       Patient transported with:   O2 @ 3 liters  Registered Nurse

## 2017-08-21 NOTE — OP NOTES
[address] [facility]  [address] [facility], [address] [facility]    OPERATIVE REPORT      NAME: Gerald Madrigal    AGE: 75 y.o. YOB: 1950    MEDICAL RECORD NUMBER: [id number]    DATE OF SURGERY: 8/21/2017    PREOPERATIVE DIAGNOSES:  1. Lumbar stenosis. 2. Acquired lumbar spondylolisthesis. POSTOPERATIVE DIAGNOSES:  1. Lumbar stenosis. 2. Acquired lumbar spondylolisthesis. OPERATIVE PROCEDURES:  1. Laminectomy, partial facetectomy, and foraminotomy of L4.   2. Laminectomy, partial facetectomy, and foraminotomy of L3.   3. Posterolateral fusion and posterior lumbar interbody fusion of L3-L4. 4. Pedicle screw instrumentation with [place] pedicle screws for L3 and L4 bilaterally. 5. Application of [place] biomechanical intervertebral body device at L3-L4. 6. Morselized allograft for spine surgery and local autograft for spine surgery with stem cells. SURGEON: Mina Pang MD     ASSISTANT: Dick Bhat PA-C    ANESTHESIA: General    ESTIMATED BLOOD LOSS: 242    COMPLICATIONS: None apparent    NEUROMONITORING: We used SSEPs and spontaneous EMGs with pedicle screw stimulation. INDICATION: The patient is a very pleasant 75 y.o. male with debilitating leg pain and back pain. He failed conservative measures. He elected to proceed with operative intervention. He was aware of the risks, benefits, and alternatives. He provided informed consent. DESCRIPTION OF PROCEDURE: The patient was identified in the preoperative holding area. The lumbar spine was marked by me. He was transferred to the operating room where general anesthesia was given. He was also given perioperative antibiotics. He was placed prone on the [facility] table. All bony prominences were well padded. The lumbar spine was prepped and draped in the usual standard fashion. We performed a surgical time out. I made a skin incision from L3 to L5.  I exposed the posterior lumbar spine in standard fashion. I took intraoperative fluoroscopy to verify our levels. I exposed the transverse processes of L3 and L4 bilaterally. I then began my decompression by performing a laminectomy of L3 and L4. I also performed a partial facetectomy and foraminotomy to decompress the thecal sac and nerve roots of L3 and L4 bilaterally. I performed a transforaminal approach to the L3-L4 disc space on the right side. I removed the inferior articular process of L3 on the right side. I decompressed the stenosis found within the foramen and lateral to the foramen for L3-L4. At this point our transforaminal approach on the right side was complete with exposure of the right L3-L4 disc space. I then performed a standard discectomy at L3-L4. I prepared the endplates to bleeding bone. I performed trial sizing. I placed a biomechanical device into L3-L4 with the appropriate amount of tension and alignment. I placed morselized allograft and autograft into the biomechanical device. I then cannulated our pedicles for L3 and L4 bilaterally in standard fashion. I probed each pedicle. I copiously irrigated the entire wound. I decorticated our fusion beds bilaterally with a high-speed shana. I placed our bone graft into our fusions beds with stem cells bilaterally. I then placed pedicle screws for L3 and L4 bilaterally in standard fashion under fluoroscopic guidance. I had good purchase for each pedicle. I then stimulated all 4 pedicle screws with pedicle screw stimulation. The pedicle screws were found to be within the appropriate range of amplitude. I then placed contoured rods on top of the pedicles bilaterally. The rods were locked to the pedicle screws according to the 's specification with set screws. We had good hemostasis. There was no CSF leaking. The fusion beds were packed with morselized allograft and local autograft. The exposed neurologic elements were protected with Duragen.  I injected the soft tissues with a pain management cocktail solution. A deep drain was placed. The wound was closed in layers. A sterile dressing was applied. The patient was extubated and transferred to the recovery room in good medical condition. Dr. Ammon PRESTON, performed the above procedures.     Ammon Bansal MD  8/21/2017

## 2017-08-21 NOTE — ANESTHESIA POSTPROCEDURE EVALUATION
Post-Anesthesia Evaluation and Assessment    Patient: Liana Willson MRN: [id number]  SSN: [id number]    YOB: 1950  Age: 75 y.o. Sex: male       Cardiovascular Function/Vital Signs  Visit Vitals    /74    Pulse 71    Temp 37 °C (98.6 °F)    Resp 9    Ht 5' 9\" (1.753 m)    Wt 78.8 kg (173 lb 11.6 oz)    SpO2 97%    BMI 25.65 kg/m2       Patient is status post general anesthesia for Procedure(s):  L3-L4 LAMINECTOMY, FUSION WITH INSTRUMENTATION, TLIF, BONEGRAFT. Nausea/Vomiting: None    Postoperative hydration reviewed and adequate. Pain:  Pain Scale 1: Numeric (0 - 10) (08/21/17 1502)  Pain Intensity 1: 0 (08/21/17 1502)   Managed    Neurological Status:   Neuro (WDL): Exceptions to WDL (08/21/17 1502)  Neuro  Neurologic State: Drowsy (08/21/17 1502)   At baseline    Mental Status and Level of Consciousness: Arousable    Pulmonary Status:   O2 Device: Nasal cannula (08/21/17 1506)   Adequate oxygenation and airway patent    Complications related to anesthesia: None    Post-anesthesia assessment completed.  No concerns    Signed By: Bhavna Rios DO     August 21, 2017

## 2017-08-21 NOTE — PROGRESS NOTES
Bedside and Verbal shift change report given to Mukesh Squires RN (oncoming nurse) by Antonio Ojeda RN (offgoing nurse). Report included the following information SBAR, Kardex, OR Summary, Procedure Summary, Intake/Output and MAR.

## 2017-08-21 NOTE — IP AVS SNAPSHOT
Bernardino Szymanski 
 
 
 [address] [facility] 
[phone] Patient: Jeri Chen MRN: [id number] AYN:2/9/1995 You are allergic to the following Allergen Reactions Sulfa (Sulfonamide Antibiotics) Hives Told this by his mother Recent Documentation Height Weight BMI Smoking Status 1.753 m 78.8 kg 25.65 kg/m2 Former Smoker Emergency Contacts Name Discharge Info Relation Home Work Mobile [facility] DISCHARGE CAREGIVER [3] Spouse [3] [phone] About your hospitalization You were admitted on:  August 21, 2017 You last received care in the:  [facility] 4M POST SURG ORT 1 You were discharged on:  August 25, 2017 Unit phone number:  [phone] Why you were hospitalized Your primary diagnosis was:  Not on File Your diagnoses also included:  Lumbar Stenosis With Neurogenic Claudication Providers Seen During Your Hospitalizations Provider Role Specialty Primary office phone Elton Atkinson MD Attending Provider Orthopedic Surgery [phone] Your Primary Care Physician (PCP) Primary Care Physician Office Phone Office Fax Rhoda Hernandez [phone] Follow-up Information Follow up With Details Comments Contact Info  [place] [address] 
[phone] Zara Andrade MD   [address] [place] [address]vä[facility] 7 [address] 
[phone] [facility] Bed  [phone] Current Discharge Medication List  
  
START taking these medications Dose & Instructions Dispensing Information Comments Morning Noon Evening Bedtime  
 diazePAM 5 mg tablet Commonly known as:  VALIUM Your last dose was: Your next dose is:    
   
   
 Dose:  5 mg Take 1 Tab by mouth every eight (8) hours as needed (Spasms).  Max Daily Amount: 15 mg. Quantity:  20 Tab Refills:  0  
     
   
   
   
  
 docusate sodium 100 mg capsule Commonly known as:  Madhu Mars Your last dose was: Your next dose is:    
   
   
 Dose:  100 mg Take 1 Cap by mouth two (2) times a day for 90 days. Quantity:  60 Cap Refills:  2  
     
   
   
   
  
 naloxone 4 mg/actuation Spry Commonly known as:  [employer] Your last dose was: Your next dose is: Take as directed. Use 1 spray intranasally into 1 nostril. Use a new Narcan nasal spray for subsequent doses and administer into alternating nostrils. May repeat every 2 to 3 minutes as needed. Quantity:  2 Actuation(s) Refills:  0  
     
   
   
   
  
 promethazine 25 mg tablet Commonly known as:  PHENERGAN Your last dose was: Your next dose is:    
   
   
 Dose:  25 mg Take 1 Tab by mouth every six (6) hours as needed for Nausea. Quantity:  30 Tab Refills:  0  
     
   
   
   
  
 tapentadol 50 mg tablet Commonly known as:  Lacy Carbine Your last dose was: Your next dose is:    
   
   
 Dose:   mg Take  mg by mouth every four (4) hours as needed. Max Daily Amount: 600 mg. Quantity:  80 Tab Refills:  0 CONTINUE these medications which have NOT CHANGED Dose & Instructions Dispensing Information Comments Morning Noon Evening Bedtime  
 allopurinol 300 mg tablet Commonly known as:  Asha Mercedes Your last dose was: Your next dose is: Take  by mouth nightly. Refills:  0  
     
   
   
   
  
 atorvastatin 40 mg tablet Commonly known as:  LIPITOR Your last dose was: Your next dose is: Take  by mouth nightly. Refills:  0  
     
   
   
   
  
 BYSTOLIC 5 mg tablet Generic drug:  nebivolol Your last dose was: Your next dose is: Take  by mouth daily (after breakfast). Refills:  0 indapamide 1.25 mg tablet Commonly known as:  Erick Snide Your last dose was: Your next dose is: Take  by mouth every morning. Refills:  0  
     
   
   
   
  
 losartan 100 mg tablet Commonly known as:  COZAAR Your last dose was: Your next dose is:    
   
   
 Dose:  100 mg Take 100 mg by mouth daily. AT BEDTIME Refills:  0 ZETIA 10 mg tablet Generic drug:  ezetimibe Your last dose was: Your next dose is: Take  by mouth daily (after breakfast). Refills:  0 STOP taking these medications   
 aspirin delayed-release 81 mg tablet  
   
  
 diclofenac EC 75 mg EC tablet Commonly known as:  VOLTAREN Where to Get Your Medications Information on where to get these meds will be given to you by the nurse or doctor. ! Ask your nurse or doctor about these medications  
  diazePAM 5 mg tablet  
 docusate sodium 100 mg capsule  
 naloxone 4 mg/actuation Spry  
 promethazine 25 mg tablet  
 tapentadol 50 mg tablet Discharge Instructions Lumbar Spinal Surgery Discharge Instructions Dr. Evelin Nelson [address] [place] [address] [phone] Activity:   
? You are going home a well person, be as active as possible. Your only exercise should be walking. Start with short frequent walks and increase your walking distance each day. Start with walking twice a day for 5 minutes and increase your distance each day 2-3 minutes until you reach 25 minutes twice a day. Limit the amount of time you sit to 20-30 minute intervals. Sitting for prolonged periods of time will be uncomfortable for you following your surgery. ? No bending, lifting (of 5lbs or more), twisting, or straining. ? Do not drive until your doctor states you may do so. However, you may ride in a car for short distances. ? If you are required to wear a brace, you should wear it at all times when you are out of bed. You may remove it when sleeping unless your physician advises you against it. ? When you are in the bed, you may lay on your back or on either side. Do not lie on your stomach. ? You may resume sexual relations 4-6 weeks after surgery. ? Continue to use your incentive spirometer for deep breathing exercises. Driving: ? You may not drive or return to work until instructed by your physician. However, you may ride in the car for short periods of time. Brace: ? If you have a back brace, you should wear your brace at all times when you are out of bed. Do not wear the brace while in bed or showering. ? Remember to always wear a cotton t-shirt underneath your brace. ? Do not bend or twist when your brace is off. Diet: 
? You may resume your regular home diet as tolerated. If your throat is sore, you should eat soft foods for the first couple of days. ? Be sure to drink plenty of fluids; it is important to keep yourself hydrated. ? Avoid alcoholic beverages and ABSOLUTELY NO tobacco products. Tobacco products will interfere with your healing. If you continue to use tobacco, you may end up needing another surgery in the future. Dressing: You have on a Prineo dressing. This is a waterproof bacteriostatic dressing that requires no post-operative care. Please do not peel the dressing off, or apply any oil based products, as they may expedite the deterioration of the mesh. The dressing will slowly chip off on its own. 
? Do not rub or apply lotion or ointments to incision site. Shower: ? You may shower 5 days after your surgery. ? You may remove your brace during showers. ? NO not use tub baths, swimming pools or Jacuzzis. Medications: 
? Check with your physician before taking any anti-inflammatory medications. (Advil, Aleve, Ibuprofen, Aspirin) ? Take your pain medication as directed ? Do NOT take additional Tylenol if your prescribed pain medication has acetaminophen in it (Endocet/Percocet, Lortab, Norco) ? It is important to have regular bowel movements. Pain medications can be constipating. Stool softeners, warm prune juice, increasing your water intake, and increasing fiber in your diet can help in preventing constipation. ? Do NOT take laxatives if at all possible except in severe situations. It can result in a vicious cycle of constipation and diarrhea. Stockings: ? You have been given T.E.D. stockings to wear. Continue to wear these for 7 days after your discharge. Put them on in the morning and take them off at night. They are used to increase your circulation and prevent blood clots from forming in your legs. Follow-Up ? Please call ASAP to schedule your 1st post-operative appointment. This should be approximately 3 weeks from your surgery date. Notify your physician in you develop any of the following conditions: 
? Fever above 101 degrees for 24 hours. ? Nausea or vomiting. ? Severe headache. 
? Inability to urinate ? Loss of bowel or bladder function (sudden onset of incontinence) ? Changes in sensation in your extremities (numbness, tingling, loss of color). ? Severe pain or pain not relieved by medications. ? Redness, swelling, or drainage from your incision. ? Persistent pain in the chest.  
? Pain in the calf of either leg. 
? Increased weakness (if this is greater than before your surgery). If you have any questions about your dressing contact your Orthopaedic Surgeons office. Naloxone (Into the nose) Naloxone (nal-OX-one) Treats opioid overdose in an emergency situation. Must be given as soon as possible. Brand Name(s): Narcan There may be other brand names for this medicine. When This Medicine Should Not Be Used: This medicine is not right for everyone. Do not use it if you had an allergic reaction to naloxone. How to Use This Medicine:  
Spray · Your doctor will tell you how much medicine to use. Do not use more than directed. · This medicine must be given to you (the patient) by someone else. Talk with people close to you so they know what to do in case of an emergency. · Read and follow the patient instructions that come with this medicine. Talk to your doctor or pharmacist if you have any questions. · This medicine is for use only in the nose. Do not get any of it in your eyes or on your skin. If it does get on these areas, rinse it off right away. · To use:  
¨ Each nasal spray contains only one dose of naloxone. Do not prime or test the nasal spray. ¨ Hold the nasal spray with your thumb on the bottom of the plunger and your first and middle fingers on either side of the nozzle. ¨ Lay the patient on his or her back. Support the patient's neck with your hand and let the head tilt back. ¨ Gently insert the tip of the nozzle into one nostril, until your fingers on either side of the nozzle are against the bottom of the patient's nose. ¨ Press the plunger firmly to give the dose. Remove the nasal spray from the patient's nose. ¨ Move the patient on his or her side (recovery position). Get emergency medical help right away. ¨ Watch the patient closely. If needed, you may give more doses every 2 to 3 minutes until the patient responds. Use a new nasal spray for each dose and spray the medicine into the other nostril each time. · Store the medicine in a closed container at room temperature, away from heat, moisture, and direct light. Do not freeze. · Ask your pharmacist about the best way to dispose of medicine that you do not use. Drugs and Foods to Avoid: Ask your doctor or pharmacist before using any other medicine, including over-the-counter medicines, vitamins, and herbal products. Warnings While Using This Medicine: · Tell your doctor if you are pregnant or breastfeeding, or if you have heart or blood vessel disease. · This medicine should be given right away after a suspected or known overdose of an opioid (narcotic) medicine. This will help prevent serious breathing problems and severe sleepiness that can lead to death. · The effects of the opioid medicine may last longer than the effects of the naloxone. This means the breathing problems and sleepiness could come back. Always call for emergency help after the first dose of naloxone. · This medicine could cause withdrawal symptoms from the opioid medicine. · Keep all medicine out of the reach of children. Never share your medicine with anyone. Possible Side Effects While Using This Medicine:  
Call your doctor right away if you notice any of these side effects: · Allergic reaction: Itching or hives, swelling in your face or hands, swelling or tingling in your mouth or throat, chest tightness, trouble breathing · Crying more than the usual (in babies) · Diarrhea, nausea, vomiting, stomach cramps · Fast, pounding, or uneven heartbeat · Fever, runny nose, sneezing, sweating, yawning · Ongoing trouble breathing · Seizure, shaking, or feeling restless, nervous, or irritable If you notice these less serious side effects, talk with your doctor:  
· Headache · Joint or muscle pain If you notice other side effects that you think are caused by this medicine, tell your doctor. Call your doctor for medical advice about side effects. You may report side effects to FDA at 6-971-FDA-6778 © 2017 [address] Information is for End User's use only and may not be sold, redistributed or otherwise used for commercial purposes. The above information is an  only. It is not intended as medical advice for individual conditions or treatments. Talk to your doctor, nurse or pharmacist before following any medical regimen to see if it is safe and effective for you. MyChart Activation Thank you for enrolling in [address] Please follow the instructions below to securely access your online medical record. [employer] allows you to send messages to your doctor, view your test results, renew your prescriptions, schedule appointments, and more. How Do I Sign Up? 1. In your internet browser, go to [email or web address]. 2. Click on the First Time User? Click Here link in the Sign In box. You will see the New Member Sign Up page. 3. Enter your [employer] Access Code exactly as it appears below. You will not need to use this code after youve completed the sign-up process. If you do not sign up before the expiration date, you must request a new code. [employer] Access Code: [email or web address] Expires: 10/26/2017  8:28 AM  
 
4. Enter the last four digits of your Social Security Number (xxxx) and Date of Birth (mm/dd/yyyy) as indicated and click Submit. You will be taken to the next sign-up page. 5. Create a [employer] ID. This will be your [employer] login ID and cannot be changed, so think of one that is secure and easy to remember. 6. Create a [employer] password. You can change your password at any time. 7. Enter your Password Reset Question and Answer. This can be used at a later time if you forget your password. 8. Enter your e-mail address. You will receive e-mail notification when new information is available in [address] 9. Click Sign Up. You can now view your medical record. Additional Information Remember, [employer] is NOT to be used for urgent needs. For medical emergencies, dial 911. Now available from your iPhone and Android! Discharge Orders None Introducing [facility]! [address] introduces [employer] patient portal. Now you can access parts of your medical record, email your doctor's office, and request medication refills online. 1. In your internet browser, go to [email or web address] 2. Click on the First Time User? Click Here link in the Sign In box. You will see the New Member Sign Up page. 3. Enter your Whatever Access Code exactly as it appears below. You will not need to use this code after youve completed the sign-up process. If you do not sign up before the expiration date, you must request a new code. · Whatever Access Code: [email or web address] Expires: 10/26/2017  8:28 AM 
 
4. Enter the last four digits of your Social Security Number (xxxx) and Date of Birth (mm/dd/yyyy) as indicated and click Submit. You will be taken to the next sign-up page. 5. Create a Whatever ID. This will be your Whatever login ID and cannot be changed, so think of one that is secure and easy to remember. 6. Create a Whatever password. You can change your password at any time. 7. Enter your Password Reset Question and Answer. This can be used at a later time if you forget your password. 8. Enter your e-mail address. You will receive e-mail notification when new information is available in [address] 9. Click Sign Up. You can now view and download portions of your medical record. 10. Click the Download Summary menu link to download a portable copy of your medical information. If you have questions, please visit the Frequently Asked Questions section of the Whatever website. Remember, Whatever is NOT to be used for urgent needs. For medical emergencies, dial 911. Now available from your iPhone and Android! General Information Please provide this summary of care documentation to your next provider. Patient Signature:  ____________________________________________________________ Date:  ____________________________________________________________  
  
Guinevere Coup Provider Signature:  ____________________________________________________________ Date:  ____________________________________________________________

## 2017-08-21 NOTE — H&P
Date of Surgery Update:  Alan Tao was seen and examined. History and physical has been reviewed. The patient has been examined.  There have been no significant clinical changes since the completion of the originally dated History and Physical.    Signed By: Mukesh Wong MD     August 21, 2017 10:23 AM

## 2017-08-22 LAB
ANION GAP SERPL CALC-SCNC: 5 MMOL/L (ref 5–15)
BUN SERPL-MCNC: 17 MG/DL (ref 6–20)
BUN/CREAT SERPL: 20 (ref 12–20)
CALCIUM SERPL-MCNC: 8.4 MG/DL (ref 8.5–10.1)
CHLORIDE SERPL-SCNC: 103 MMOL/L (ref 97–108)
CO2 SERPL-SCNC: 29 MMOL/L (ref 21–32)
CREAT SERPL-MCNC: 0.86 MG/DL (ref 0.7–1.3)
ERYTHROCYTE [DISTWIDTH] IN BLOOD BY AUTOMATED COUNT: 13.5 % (ref 11.5–14.5)
GLUCOSE SERPL-MCNC: 142 MG/DL (ref 65–100)
HCT VFR BLD AUTO: 35.3 % (ref 36.6–50.3)
HGB BLD-MCNC: 12.2 G/DL (ref 12.1–17)
MCH RBC QN AUTO: 31.9 PG (ref 26–34)
MCHC RBC AUTO-ENTMCNC: 34.6 G/DL (ref 30–36.5)
MCV RBC AUTO: 92.2 FL (ref 80–99)
PLATELET # BLD AUTO: 216 K/UL (ref 150–400)
POTASSIUM SERPL-SCNC: 4.4 MMOL/L (ref 3.5–5.1)
RBC # BLD AUTO: 3.83 M/UL (ref 4.1–5.7)
SODIUM SERPL-SCNC: 137 MMOL/L (ref 136–145)
WBC # BLD AUTO: 9.8 K/UL (ref 4.1–11.1)

## 2017-08-22 PROCEDURE — 74011250636 HC RX REV CODE- 250/636: Performed by: ORTHOPAEDIC SURGERY

## 2017-08-22 PROCEDURE — 65270000029 HC RM PRIVATE

## 2017-08-22 PROCEDURE — 80048 BASIC METABOLIC PNL TOTAL CA: CPT | Performed by: ORTHOPAEDIC SURGERY

## 2017-08-22 PROCEDURE — 74011250637 HC RX REV CODE- 250/637: Performed by: NURSE PRACTITIONER

## 2017-08-22 PROCEDURE — 97116 GAIT TRAINING THERAPY: CPT

## 2017-08-22 PROCEDURE — 74011250637 HC RX REV CODE- 250/637: Performed by: ORTHOPAEDIC SURGERY

## 2017-08-22 PROCEDURE — 97165 OT EVAL LOW COMPLEX 30 MIN: CPT

## 2017-08-22 PROCEDURE — 85027 COMPLETE CBC AUTOMATED: CPT | Performed by: ORTHOPAEDIC SURGERY

## 2017-08-22 PROCEDURE — 36415 COLL VENOUS BLD VENIPUNCTURE: CPT | Performed by: ORTHOPAEDIC SURGERY

## 2017-08-22 PROCEDURE — 97530 THERAPEUTIC ACTIVITIES: CPT

## 2017-08-22 PROCEDURE — 77010033678 HC OXYGEN DAILY

## 2017-08-22 PROCEDURE — 74011250636 HC RX REV CODE- 250/636: Performed by: NURSE PRACTITIONER

## 2017-08-22 PROCEDURE — G8987 SELF CARE CURRENT STATUS: HCPCS

## 2017-08-22 PROCEDURE — G8988 SELF CARE GOAL STATUS: HCPCS

## 2017-08-22 PROCEDURE — 97161 PT EVAL LOW COMPLEX 20 MIN: CPT

## 2017-08-22 RX ORDER — MORPHINE SULFATE 4 MG/ML
2 INJECTION, SOLUTION INTRAMUSCULAR; INTRAVENOUS
Status: DISCONTINUED | OUTPATIENT
Start: 2017-08-22 | End: 2017-08-23

## 2017-08-22 RX ORDER — HYDROCODONE BITARTRATE AND ACETAMINOPHEN 10; 325 MG/1; MG/1
1 TABLET ORAL
Status: DISCONTINUED | OUTPATIENT
Start: 2017-08-22 | End: 2017-08-23

## 2017-08-22 RX ORDER — HYDROCODONE BITARTRATE AND ACETAMINOPHEN 5; 325 MG/1; MG/1
1 TABLET ORAL
Status: DISCONTINUED | OUTPATIENT
Start: 2017-08-22 | End: 2017-08-23

## 2017-08-22 RX ORDER — TRAMADOL HYDROCHLORIDE 50 MG/1
100 TABLET ORAL
Status: DISCONTINUED | OUTPATIENT
Start: 2017-08-22 | End: 2017-08-25 | Stop reason: HOSPADM

## 2017-08-22 RX ORDER — CEFAZOLIN SODIUM IN 0.9 % NACL 2 G/50 ML
2 INTRAVENOUS SOLUTION, PIGGYBACK (ML) INTRAVENOUS EVERY 8 HOURS
Status: DISCONTINUED | OUTPATIENT
Start: 2017-08-22 | End: 2017-08-24

## 2017-08-22 RX ADMIN — ONDANSETRON 4 MG: 2 INJECTION INTRAMUSCULAR; INTRAVENOUS at 01:22

## 2017-08-22 RX ADMIN — CEFAZOLIN 2 G: 1 INJECTION, POWDER, FOR SOLUTION INTRAMUSCULAR; INTRAVENOUS; PARENTERAL at 10:24

## 2017-08-22 RX ADMIN — Medication 10 ML: at 21:08

## 2017-08-22 RX ADMIN — Medication 2 MG: at 21:08

## 2017-08-22 RX ADMIN — HYDROCODONE BITARTRATE AND ACETAMINOPHEN 1 TABLET: 10; 325 TABLET ORAL at 23:02

## 2017-08-22 RX ADMIN — ALLOPURINOL 300 MG: 300 TABLET ORAL at 21:08

## 2017-08-22 RX ADMIN — NEBIVOLOL HYDROCHLORIDE 5 MG: 5 TABLET ORAL at 08:58

## 2017-08-22 RX ADMIN — ONDANSETRON 4 MG: 2 INJECTION INTRAMUSCULAR; INTRAVENOUS at 11:16

## 2017-08-22 RX ADMIN — TRAMADOL HYDROCHLORIDE 100 MG: 50 TABLET, FILM COATED ORAL at 16:30

## 2017-08-22 RX ADMIN — HYDROMORPHONE HYDROCHLORIDE 1 MG: 1 INJECTION, SOLUTION INTRAMUSCULAR; INTRAVENOUS; SUBCUTANEOUS at 10:49

## 2017-08-22 RX ADMIN — ONDANSETRON 4 MG: 2 INJECTION INTRAMUSCULAR; INTRAVENOUS at 06:50

## 2017-08-22 RX ADMIN — INDAPAMIDE 1.25 MG: 2.5 TABLET, FILM COATED ORAL at 06:51

## 2017-08-22 RX ADMIN — Medication: at 07:36

## 2017-08-22 RX ADMIN — Medication 10 ML: at 06:51

## 2017-08-22 RX ADMIN — HYDROCODONE BITARTRATE AND ACETAMINOPHEN 1 TABLET: 10; 325 TABLET ORAL at 10:21

## 2017-08-22 RX ADMIN — CEFAZOLIN 2 G: 1 INJECTION, POWDER, FOR SOLUTION INTRAMUSCULAR; INTRAVENOUS; PARENTERAL at 01:22

## 2017-08-22 RX ADMIN — DIAZEPAM 5 MG: 5 TABLET ORAL at 23:02

## 2017-08-22 RX ADMIN — LOSARTAN POTASSIUM 100 MG: 50 TABLET, FILM COATED ORAL at 21:08

## 2017-08-22 RX ADMIN — DIAZEPAM 5 MG: 5 TABLET ORAL at 18:58

## 2017-08-22 RX ADMIN — FAMOTIDINE 20 MG: 20 TABLET, FILM COATED ORAL at 08:58

## 2017-08-22 RX ADMIN — DOCUSATE SODIUM 100 MG: 100 CAPSULE ORAL at 08:58

## 2017-08-22 RX ADMIN — SODIUM CHLORIDE 125 ML/HR: 900 INJECTION, SOLUTION INTRAVENOUS at 06:52

## 2017-08-22 RX ADMIN — HYDROCODONE BITARTRATE AND ACETAMINOPHEN 1 TABLET: 10; 325 TABLET ORAL at 15:10

## 2017-08-22 RX ADMIN — ATORVASTATIN CALCIUM 40 MG: 10 TABLET, FILM COATED ORAL at 21:08

## 2017-08-22 RX ADMIN — HYDROCODONE BITARTRATE AND ACETAMINOPHEN 1 TABLET: 10; 325 TABLET ORAL at 18:52

## 2017-08-22 RX ADMIN — CEFAZOLIN 2 G: 1 INJECTION, POWDER, FOR SOLUTION INTRAMUSCULAR; INTRAVENOUS; PARENTERAL at 18:20

## 2017-08-22 RX ADMIN — EZETIMIBE 10 MG: 10 TABLET ORAL at 08:59

## 2017-08-22 RX ADMIN — DOCUSATE SODIUM 100 MG: 100 CAPSULE ORAL at 18:21

## 2017-08-22 RX ADMIN — FAMOTIDINE 20 MG: 20 TABLET, FILM COATED ORAL at 21:08

## 2017-08-22 NOTE — PROGRESS NOTES
8/22/2017 3:08 PM Order for RW received and sent to [facility] via [employer]. 8/22/2017  9:44 AM Case management consult for home health received. Met with pt. Charted address and phone numbers confirmed. Pt lives with his wife in a 2 story home in [facility] [place]. There are 5 steps to enter pt's home and 12 to his bedroom. Pt was independent with adls and driving prior to admission. Pt has rx coverage and fills his scripts at the [facility] on [address]. Home health discussed, choice given, At [address] selected as preference. Referral sent via [employer]. Pt does not own any DME, choice given, [facility] selected as preference. CM will follow. NORMAN Covarrubias   Care Management Interventions  PCP Verified by CM: Yes (Deysi Cano )  Mode of Transport at Discharge:  Other (see comment) (Pt's wife )  Transition of Care Consult (CM Consult): [address]: No  Reason Outside [place]: Physician referred to specific agency  [place] Signup: No  Physical Therapy Consult: Yes  Occupational Therapy Consult: Yes  Speech Therapy Consult: No  Current Support Network: Own Home, Lives with Spouse

## 2017-08-22 NOTE — PROGRESS NOTES
Orders received and attempted to see patient. He refused PT due to pain and nausea. Nursing aware.  Will try again in PM.

## 2017-08-22 NOTE — PROGRESS NOTES
ORTHOPAEDIC LUMBAR FUSION PROGRESS NOTE    NAME:     Luis E Huynh   :       1950   MRN:       [id number]   DATE:      2017    POD:    1 Day Post-Op  S/P:    Procedure(s):  L3-L4 LAMINECTOMY, FUSION WITH INSTRUMENTATION, TLIF, BONEGRAFT    SUBJECTIVE:    C/O back pain along surgical incision, pt having some nausea  No leg pain or numbness  Denies vomiting, chest pain, headache or shortness of breath  Pain controlled    Recent Labs      17   0124   HGB  12.2   HCT  35.3*   NA  137   K  4.4   CL  103   CO2  29   BUN  17   CREA  0.86   GLU  142*     Patient Vitals for the past 12 hrs:   BP Temp Pulse Resp SpO2   17 0515 99/60 97.1 °F (36.2 °C) 89 16 96 %   17 0024 107/68 97.5 °F (36.4 °C) 61 14 97 %   17 1906 116/77 96.2 °F (35.7 °C) (!) 57 12 97 %       EXAM:  Dressings clean, dry and intact   Positive strength/ROM bilat lower ext.   Neuro intact to sensation  Calves, soft & nontender  BL LEs NVID      PLAN:  PRN anti-nausea medication  D/C PCA, change to PO pain medications  PT/OT, OOB  Advance regular diet      Jann Jose, [address]  Orthopaedic Surgery  Physician Assistant to Dr. Cristal Lincoln

## 2017-08-22 NOTE — PROGRESS NOTES
Problem: Self Care Deficits Care Plan (Adult)  Goal: *Acute Goals and Plan of Care (Insert Text)  Occupational Therapy Goals  Initiated 8/22/2017    1. Patient will perform toilet transfer with modified independence using most appropriate DME within 7 days. 2. Patient will perform lower body dressing with modified independence using most appropriate AE within 7 days. 3. Patient will stand to perform 3 grooming tasks at modified independence within 7 days. 4. Patient will don/doff back brace at modified independence within 7 days. 5. Patient will verbalize/demonstrate 3/3 back precautions during ADL tasks without cues within 7 days. OCCUPATIONAL THERAPY EVALUATION  Patient: Amanda Fenton (84 y.o. male)  Date: 8/22/2017  Primary Diagnosis: Lumbar stenosis [M48.06]  Procedure(s) (LRB):  L3-L4 LAMINECTOMY, FUSION WITH INSTRUMENTATION, TLIF, BONEGRAFT (N/A) 1 Day Post-Op   Precautions:   Fall, Spinal (brace when OOB)      ASSESSMENT :     Chart reviewed and patient agreeable to therapy. He states that his nausea has improved a little bit but he is still not eating much. Based on the objective data described below, the patient presents with decreased independence in ADLs and mobility secondary to back precautions limiting functional reach and mobility, pain, decreased balance, endurance and overall strength. Today he required Min A for log roll technique and transfer to chair using RW, Min A toileting and toilet transfer, and Max A LB dressing/bathing. PTA he was independent and going to the gym. Patient expected to progress well once educated on LB dressing AE. Patient will benefit from home health OT once discharged from this facility. Patient will benefit from skilled intervention to address the above impairments.   Patients rehabilitation potential is considered to be Excellent  Factors which may influence rehabilitation potential include:   [X]             None noted  [ ]             Mental ability/status  [ ]             Medical condition  [ ]             Home/family situation and support systems  [ ]             Safety awareness  [ ]             Pain tolerance/management  [ ]             Other:        PLAN :  Recommendations and Planned Interventions:  [X]               Self Care Training                  [X]        Therapeutic Activities  [X]               Functional Mobility Training    [ ]        Cognitive Retraining  [X]               Therapeutic Exercises           [X]        Endurance Activities  [ ]               Balance Training                   [ ]        Neuromuscular Re-Education  [ ]               Visual/Perceptual Training     [X]   Home Safety Training  [X]               Patient Education                 [X]        Family Training/Education  [ ]               Other (comment):     Frequency/Duration: Patient will be followed by occupational therapy 5 times a week to address goals. Discharge Recommendations: Home Health  Further Equipment Recommendations for Discharge: TBD       SUBJECTIVE:   Patient stated I haven't gotten up yet.       OBJECTIVE DATA SUMMARY:   HISTORY:   Past Medical History:   Diagnosis Date    Aneurysm ([address]       small AAA (2.7 cm) - followed by Dr. Angeliak Prado Arthritis      Chronic pain       lower back    Gout      Hypertension      Joint pain 9/4/13    Musculoskeletal disorder      Other ill-defined conditions       Inguinal hernia    Other ill-defined conditions       hypercholesterolemia     Past Surgical History:   Procedure Laterality Date    HX HEENT   1986     septoplasty    HX HERNIA REPAIR Right 09/09/2013     inguinal with mesh    HX SEPTOPLASTY   1998    HX VASECTOMY   1989        Prior Level of Function/Home Situation: Patient lives with wife in a 2 level home with 14 steps to access second floor. PTA he was independent and going to the gym weekly.   Expanded or extensive additional review of patient history:      [facility] Environment: Private residence  # Steps to Enter: 5  Rails to [employer]: Yes  One/Two Story Residence: Two story  # of Interior Steps: 12  Interior Rails: Both  Lift Chair Available: No  Living Alone: No  Support Systems: Spouse/Significant Other/Partner  Patient Expects to be Discharged to[de-identified] Private residence  Current DME Used/Available at Home: None  [X]  Right hand dominant             [ ]  Left hand dominant     EXAMINATION OF PERFORMANCE DEFICITS:  Cognitive/Behavioral Status:       Ox 4, pleasant, alert, motivated            Skin: intact, IV UE  When removing SCDs and TEDS, therapist noticed old blood that was on SCD and TEDS (in the same area on lateral/posterior L calf)- informed RN who came to assess patient and look further into this situation. Edema: none in the uppers     Hearing: Auditory  Auditory Impairment: None  Hearing Aids/Status: Does not own     Vision/Perceptual:         No report of recent changes in vision. Range of Motion:     AROM: Within functional limits (BLE)        Strength:     Strength: Generally decreased, functional (BLE)           Coordination:  Coordination: Generally decreased, functional  Fine Motor Skills-Upper: Left Intact; Right Intact          Tone & Sensation:     Tone: Normal  Sensation: Intact (denies numbness or tingling)           Balance:  Sitting: Intact  Standing: Intact; With support     Functional Mobility and Transfers for ADLs:  Bed Mobility:  Rolling: Additional time;Modified independent  Supine to Sit: Additional time;Minimum assistance     Transfers:  Sit to Stand: Minimum assistance; Additional time  Stand to Sit: Minimum assistance; Additional time  Bed to Chair: Minimum assistance  Toilet Transfer : Minimum assistance     ADL Assessment:  Feeding: Independent     Oral Facial Hygiene/Grooming: Setup     Bathing:  Moderate assistance (A with lower body)     Upper Body Dressing: Setup (A with brace)     Lower Body Dressing: Maximum assistance (A donning clothing/shoes over feet )     Toileting: Minimum assistance (Min A for standing balance)        ADL Intervention and task modifications:           OT evaluation completed. Assisted patient OOB to chair. Reviewed log roll technique, back precautions, safe transfer technique, and LB AE options. Patient demonstrated good understanding. Functional Measure:  Barthel Index:      Bathin  Bladder: 0  Bowels: 10  Groomin  Dressin  Feeding: 10  Mobility: 5  Stairs: 0  Toilet Use: 0  Transfer (Bed to Chair and Back): 5  Total: 35         Barthel and G-code impairment scale:  Percentage of impairment CH  0% CI  1-19% CJ  20-39% CK  40-59% CL  60-79% CM  80-99% CN  100%   Barthel Score 0-100 100 99-80 79-60 59-40 20-39 1-19    0   Barthel Score 0-20 20 17-19 13-16 9-12 5-8 1-4 0      The Barthel ADL Index: Guidelines  1. The index should be used as a record of what a patient does, not as a record of what a patient could do. 2. The main aim is to establish degree of independence from any help, physical or verbal, however minor and for whatever reason. 3. The need for supervision renders the patient not independent. 4. A patient's performance should be established using the best available evidence. Asking the patient, friends/relatives and nurses are the usual sources, but direct observation and common sense are also important. However direct testing is not needed. 5. Usually the patient's performance over the preceding 24-48 hours is important, but occasionally longer periods will be relevant. 6. Middle categories imply that the patient supplies over 50 per cent of the effort. 7. Use of aids to be independent is allowed. Salas Hernandez., Barthel, D.W. ([id number]). Functional evaluation: the Barthel Index. [facility] (14)2. HOLLAND Darden, Chandrakant Primus., Fabien Tanner., Mati Moreira, [address] [place] [address] ().  Measuring the change indisability after inpatient rehabilitation; comparison of the responsiveness of the Barthel Index and Functional [place] Measure. Journal of Neurology, Neurosurgery, and Psychiatry, 66(4), 396-520. AL Jefferson.EDGARDO, SEBAS Vila, & Angel Butler M.A. (2004.) Assessment of post-stroke quality of life in cost-effectiveness studies: The usefulness of the Barthel Index and the EuroQoL-5D. Quality of Life Research, 13, 313-93         G codes: In compliance with CMSs Claims Based Outcome Reporting, the following G-code set was chosen for this patient based on their primary functional limitation being treated: The outcome measure chosen to determine the severity of the functional limitation was the barthel with a score of 35 /100 which was correlated with the impairment scale. · Self Care:               - CURRENT STATUS:    CL - 60%-79% impaired, limited or restricted               - GOAL STATUS:           CK - 40%-59% impaired, limited or restricted               - D/C STATUS:                       ---------------To be determined---------------      Occupational Therapy Evaluation Charge Determination   History Examination Decision-Making   LOW Complexity : Brief history review  LOW Complexity : 1-3 performance deficits relating to physical, cognitive , or psychosocial skils that result in activity limitations and / or participation restrictions  MEDIUM Complexity : Patient may present with comorbidities that affect occupational performnce.  Miniml to moderate modification of tasks or assistance (eg, physical or verbal ) with assesment(s) is necessary to enable patient to complete evaluation       Based on the above components, the patient evaluation is determined to be of the following complexity level: LOW   Pain:  Pain Scale 1: Numeric (0 - 10)  Pain Intensity 1: 5  Pain Location 1: Back  Pain Orientation 1: Posterior  Pain Description 1: Aching  Pain Intervention(s) 1: Encouraged PCA  Activity Tolerance:   Fair      Please refer to the flowsheet for vital signs taken during this treatment. After treatment:   [X] Patient left in no apparent distress sitting up in chair  [ ] Patient left in no apparent distress in bed  [X] Call bell left within reach  [ ] Nursing notified  [X] Caregiver present  [X] chair alarm activated      COMMUNICATION/EDUCATION:   The patients plan of care was discussed with: Physical Therapist and Registered Nurse.  [X] Home safety education was provided and the patient/caregiver indicated understanding. [X] Patient/family have participated as able in goal setting and plan of care. [X] Patient/family agree to work toward stated goals and plan of care. [ ] Patient understands intent and goals of therapy, but is neutral about his/her participation. [ ] Patient is unable to participate in goal setting and plan of care. This patients plan of care is appropriate for delegation to [facility].      Thank you for this referral.  Alexa Alarcon OT  Time Calculation: 24 mins

## 2017-08-22 NOTE — PROGRESS NOTES
Problem: Mobility Impaired (Adult and Pediatric)  Goal: *Acute Goals and Plan of Care (Insert Text)  Physical Therapy Goals  Initiated 8/22/2017    1. Patient will move from supine to sit and sit to supine in bed with independence within 4 days. 2. Patient will perform sit to stand with modified independence within 4 days. 3. Patient will ambulate with modified independence for 150 feet with the least restrictive device within 4 days. 4. Patient will ascend/descend 4 stairs with 1 handrail(s) with minimal assistance/contact guard assist within 4 days. 5. Patient will verbalize and demonstrate understanding of spinal precautions (No bending, lifting greater than 5 lbs, or twisting; log-roll technique; frequent repositioning as instructed) within 4 days. PHYSICAL THERAPY EVALUATION  Patient: Luis E Huynh (65 y.o. male)  Date: 8/22/2017  Primary Diagnosis: Lumbar stenosis [M48.06]  Procedure(s) (LRB):  L3-L4 LAMINECTOMY, FUSION WITH INSTRUMENTATION, TLIF, BONEGRAFT (N/A) 1 Day Post-Op   Precautions:   Fall, Spinal (brace when OOB)      ASSESSMENT :  Based on the objective data described below, the patient presents with generalized weakness, decreased bed mobility, transfers, and functional ambulation following admission for L3-L4 lami/fusion. Patient refused AM session due to nausea and pain but feeling better now. He was instructed in log roll technique, BLT's and use of brace and expressed understanding. Patient was able to sit on edge of bed and don brace with minimal assist. He stood and ambulated 20 feet with rolling walker and min assist, 2nd person handling IV pole. Patient left up in chair with CB in reach. Vitals stable. Patient will benefit from a rolling walker and HHPT upon discharge home with his wife. Patient will benefit from skilled intervention to address the above impairments.   Patients rehabilitation potential is considered to be Good  Factors which may influence rehabilitation potential include:   [X]         None noted  [ ]         Mental ability/status  [ ]         Medical condition  [ ]         Home/family situation and support systems  [ ]         Safety awareness  [ ]         Pain tolerance/management  [ ]         Other:        PLAN :  Recommendations and Planned Interventions:  [X]           Bed Mobility Training             [ ]    Neuromuscular Re-Education  [X]           Transfer Training                   [ ]    Orthotic/Prosthetic Training  [X]           Gait Training                         [ ]    Modalities  [X]           Therapeutic Exercises           [ ]    Edema Management/Control  [ ]           Therapeutic Activities            [ ]    Patient and Family Training/Education  [ ]           Other (comment):     Frequency/Duration: Patient will be followed by physical therapy  twice daily to address goals. Discharge Recommendations: Home Health  Further Equipment Recommendations for Discharge: rolling walker       SUBJECTIVE:   Patient stated I need to apologize to you for this morning. I really felt lousy.       OBJECTIVE DATA SUMMARY:   HISTORY:    Past Medical History:   Diagnosis Date    Aneurysm ([address]       small AAA (2.7 cm) - followed by Dr. Bianka Arenas Arthritis      Chronic pain       lower back    Gout      Hypertension      Joint pain 9/4/13    Musculoskeletal disorder      Other ill-defined conditions       Inguinal hernia    Other ill-defined conditions       hypercholesterolemia     Past Surgical History:   Procedure Laterality Date    HX HEENT   1986     septoplasty    HX HERNIA REPAIR Right 09/09/2013     inguinal with mesh    HX SEPTOPLASTY   1998    HX VASECTOMY   1989     Prior Level of Function/Home Situation: independent  Personal factors and/or comorbidities impacting plan of care: none noted     Home Situation  Home Environment: Private residence  # Steps to Enter: 5  Rails to Enter: Yes  One/Two Story Residence: Two story  # of Interior Steps: 12  Interior Rails: Both  Lift Chair Available: No  Living Alone: No  Support Systems: Spouse/Significant Other/Partner  Patient Expects to be Discharged to[de-identified] Private residence  Current DME Used/Available at Home: None     EXAMINATION/PRESENTATION/DECISION MAKING:   Critical Behavior:  Neurologic State: Alert  Orientation Level: Oriented X4  Cognition: Appropriate decision making, Appropriate for age attention/concentration, Appropriate safety awareness, Follows commands     Hearing: Auditory  Auditory Impairment: None  Hearing Aids/Status: Does not own  Skin:  Not fully observed     Range Of Motion:  AROM: Within functional limits (BLE)                       Strength:    Strength: Generally decreased, functional (BLE)                    Tone & Sensation:   Tone: Normal              Sensation: Intact (denies numbness or tingling)               Coordination:  Coordination: Generally decreased, functional        Functional Mobility:  Bed Mobility:  Rolling: Additional time;Modified independent  Supine to Sit: Additional time;Minimum assistance        Transfers:  Sit to Stand: Minimum assistance; Additional time  Stand to Sit: Minimum assistance; Additional time        Bed to Chair: Minimum assistance              Balance:   Sitting: Intact  Standing: Intact; With support  Ambulation/Gait Training:  Distance (ft): 20 Feet (ft)  Assistive Device: Gait belt;Walker, rolling;Brace/Splint  Ambulation - Level of Assistance: Minimal assistance        Gait Abnormalities: Decreased step clearance        Base of Support: Narrowed     Speed/Kimberly: Pace decreased (<100 feet/min)  Step Length: Right shortened;Left shortened                                                                Functional Measure:  Barthel Index:      Bathin  Bladder: 0  Bowels: 10  Groomin  Dressin  Feeding: 10  Mobility: 5  Stairs: 0  Toilet Use: 0  Transfer (Bed to Chair and Back): 5  Total: 35         Barthel and G-code impairment scale:  Percentage of impairment CH  0% CI  1-19% CJ  20-39% CK  40-59% CL  60-79% CM  80-99% CN  100%   Barthel Score 0-100 100 99-80 79-60 59-40 20-39 1-19    0   Barthel Score 0-20 20 17-19 13-16 9-12 5-8 1-4 0      The Barthel ADL Index: Guidelines  1. The index should be used as a record of what a patient does, not as a record of what a patient could do. 2. The main aim is to establish degree of independence from any help, physical or verbal, however minor and for whatever reason. 3. The need for supervision renders the patient not independent. 4. A patient's performance should be established using the best available evidence. Asking the patient, friends/relatives and nurses are the usual sources, but direct observation and common sense are also important. However direct testing is not needed. 5. Usually the patient's performance over the preceding 24-48 hours is important, but occasionally longer periods will be relevant. 6. Middle categories imply that the patient supplies over 50 per cent of the effort. 7. Use of aids to be independent is allowed. Katie Miller., Barthel, DHarshilW. ([id number]). Functional evaluation: the Barthel Index. [address] [facility] (14)2. AUBRIE MonsonF, Kae Hernandes, Paniflo Roberson., [facility], [address] [place] [address] (1999). Measuring the change indisability after inpatient rehabilitation; comparison of the responsiveness of the Barthel Index and Functional [place] Measure. Journal of Neurology, Neurosurgery, and Psychiatry, 66(4), 429-101. [place] Patience, N.KIMBERLEY.EDGARDO, SEBAS Vila, & Prince Colin, MHarshilA. (2004.) Assessment of post-stroke quality of life in cost-effectiveness studies: The usefulness of the Barthel Index and the EuroQoL-5D.  Quality of Life Research, 15, 323-97            Physical Therapy Evaluation Charge Determination   History Examination Presentation Decision-Making   LOW Complexity : Zero comorbidities / personal factors that will impact the outcome / POC LOW Complexity : 1-2 Standardized tests and measures addressing body structure, function, activity limitation and / or participation in recreation  LOW Complexity : Stable, uncomplicated  LOW Complexity : FOTO score of       Based on the above components, the patient evaluation is determined to be of the following complexity level: LOW      Pain:  Pain Scale 1: Numeric (0 - 10)  Pain Intensity 1: 5  Pain Location 1: Back  Pain Orientation 1: Posterior  Activity Tolerance:   Good. Please refer to the flowsheet for vital signs taken during this treatment. After treatment:   [X]         Patient left in no apparent distress sitting up in chair  [ ]         Patient left in no apparent distress in bed  [X]         Call bell left within reach  [X]         Nursing notified  [X]         Caregiver present  [ ]         Bed alarm activated      COMMUNICATION/EDUCATION:   The patients plan of care was discussed with: Occupational Therapist and Registered Nurse.  [X]         Fall prevention education was provided and the patient/caregiver indicated understanding. [ ]         Patient/family have participated as able in goal setting and plan of care. [X]         Patient/family agree to work toward stated goals and plan of care. [ ]         Patient understands intent and goals of therapy, but is neutral about his/her participation. [ ]         Patient is unable to participate in goal setting and plan of care.      Thank you for this referral.  Leah Foote, PT   Time Calculation: 33 mins

## 2017-08-22 NOTE — INTERDISCIPLINARY ROUNDS
[address]    Patient Information    Name: Erick Boston  Age: 75 y.o. Admission Date: 8/21/2017  Surgery/Procedure: Procedure(s):  L3-L4 LAMINECTOMY, FUSION WITH INSTRUMENTATION, TLIF, BONEGRAFT  Attending Provider: Blas Fields MD  Surgeon: Cesar Hansen   Problem List: Active Problems:    Lumbar stenosis with neurogenic claudication (8/21/2017)      During rounds the following quality care indicators and evidence based practices were addressed :       PT/OT: Patient mobility - has not been seen yet, patient having pain and nausea issues                       Pain Assessment  Pain Intensity 1: 5 (08/22/17 0517)  Pain Location 1: Back  Pain Intervention(s) 1: Encouraged PCA  Patient Stated Pain Goal: 2    Pain meds: Norco, Tramadol, Dilaudid PCA  Antibiotics completed: No  Anticoagulation:  none  Bowel regimen: Colace  Mechanical DVT prophylaxis: TEDs, SCDs  Alvarez: N   Goals For Today: Progress with PT, pain control    RRAT Score:      Readmit Risk Tool  Support Systems: Spouse/Significant Other/Partner    Discharge Management/Planning:    Readmit Risk Assessment Information:   Low Risk            7       Total Score        2 . Living with Significant Other. Assisted Living. LTAC. SNF. or   Rehab    5 Pt. Coverage (Medicare=5 , Medicaid, or Self-Pay=4)        Criteria that do not apply:    Has Seen PCP in Last 6 Months (Yes=3, No=0)    Patient Length of Stay (>5 days = 3)    IP Visits Last 12 Months (1-3=4, 4=9, >4=11)    Charlson Comorbidity Score (Age + Comorbid Conditions)         Readmit Risk Tool  Support Systems: Spouse/Significant [address]: TBD  Rehab/SNF Facility:  Anticipated Date of Discharge: TBD  Barrier to Discharge (if any): n/a    Interdisciplinary team rounds were held with the following team members: Nurse Practitioner, Nursing, Pharmacy, and Rehab. See clinical pathway and/or care plan for interventions and desired outcomes.

## 2017-08-23 LAB
ANION GAP SERPL CALC-SCNC: 2 MMOL/L (ref 5–15)
BUN SERPL-MCNC: 11 MG/DL (ref 6–20)
BUN/CREAT SERPL: 13 (ref 12–20)
CALCIUM SERPL-MCNC: 7.8 MG/DL (ref 8.5–10.1)
CHLORIDE SERPL-SCNC: 102 MMOL/L (ref 97–108)
CO2 SERPL-SCNC: 31 MMOL/L (ref 21–32)
CREAT SERPL-MCNC: 0.85 MG/DL (ref 0.7–1.3)
ERYTHROCYTE [DISTWIDTH] IN BLOOD BY AUTOMATED COUNT: 13.4 % (ref 11.5–14.5)
GLUCOSE SERPL-MCNC: 102 MG/DL (ref 65–100)
HCT VFR BLD AUTO: 30 % (ref 36.6–50.3)
HGB BLD-MCNC: 10 G/DL (ref 12.1–17)
MCH RBC QN AUTO: 31.4 PG (ref 26–34)
MCHC RBC AUTO-ENTMCNC: 33.3 G/DL (ref 30–36.5)
MCV RBC AUTO: 94.3 FL (ref 80–99)
PLATELET # BLD AUTO: 169 K/UL (ref 150–400)
POTASSIUM SERPL-SCNC: 3.5 MMOL/L (ref 3.5–5.1)
RBC # BLD AUTO: 3.18 M/UL (ref 4.1–5.7)
SODIUM SERPL-SCNC: 135 MMOL/L (ref 136–145)
WBC # BLD AUTO: 7 K/UL (ref 4.1–11.1)

## 2017-08-23 PROCEDURE — 85027 COMPLETE CBC AUTOMATED: CPT | Performed by: ORTHOPAEDIC SURGERY

## 2017-08-23 PROCEDURE — 97116 GAIT TRAINING THERAPY: CPT

## 2017-08-23 PROCEDURE — 74011250637 HC RX REV CODE- 250/637: Performed by: ORTHOPAEDIC SURGERY

## 2017-08-23 PROCEDURE — 97530 THERAPEUTIC ACTIVITIES: CPT

## 2017-08-23 PROCEDURE — 65270000029 HC RM PRIVATE

## 2017-08-23 PROCEDURE — 36415 COLL VENOUS BLD VENIPUNCTURE: CPT | Performed by: ORTHOPAEDIC SURGERY

## 2017-08-23 PROCEDURE — 74011250636 HC RX REV CODE- 250/636: Performed by: NURSE PRACTITIONER

## 2017-08-23 PROCEDURE — 74011250636 HC RX REV CODE- 250/636: Performed by: ORTHOPAEDIC SURGERY

## 2017-08-23 PROCEDURE — 80048 BASIC METABOLIC PNL TOTAL CA: CPT | Performed by: ORTHOPAEDIC SURGERY

## 2017-08-23 PROCEDURE — 74011250637 HC RX REV CODE- 250/637: Performed by: NURSE PRACTITIONER

## 2017-08-23 RX ORDER — DEXAMETHASONE SODIUM PHOSPHATE 10 MG/ML
10 INJECTION INTRAMUSCULAR; INTRAVENOUS ONCE
Status: COMPLETED | OUTPATIENT
Start: 2017-08-23 | End: 2017-08-23

## 2017-08-23 RX ORDER — MORPHINE SULFATE 4 MG/ML
4 INJECTION, SOLUTION INTRAMUSCULAR; INTRAVENOUS
Status: DISCONTINUED | OUTPATIENT
Start: 2017-08-23 | End: 2017-08-25 | Stop reason: HOSPADM

## 2017-08-23 RX ADMIN — DIAZEPAM 5 MG: 5 TABLET ORAL at 15:35

## 2017-08-23 RX ADMIN — CEFAZOLIN 2 G: 1 INJECTION, POWDER, FOR SOLUTION INTRAMUSCULAR; INTRAVENOUS; PARENTERAL at 10:11

## 2017-08-23 RX ADMIN — Medication 2 MG: at 02:35

## 2017-08-23 RX ADMIN — DIAZEPAM 5 MG: 5 TABLET ORAL at 05:05

## 2017-08-23 RX ADMIN — HYDROCODONE BITARTRATE AND ACETAMINOPHEN 1 TABLET: 10; 325 TABLET ORAL at 10:14

## 2017-08-23 RX ADMIN — Medication 2 MG: at 07:03

## 2017-08-23 RX ADMIN — ATORVASTATIN CALCIUM 40 MG: 10 TABLET, FILM COATED ORAL at 23:13

## 2017-08-23 RX ADMIN — TAPENTADOL HYDROCHLORIDE 50 MG: 50 TABLET, FILM COATED ORAL at 23:13

## 2017-08-23 RX ADMIN — CEFAZOLIN 2 G: 1 INJECTION, POWDER, FOR SOLUTION INTRAMUSCULAR; INTRAVENOUS; PARENTERAL at 18:55

## 2017-08-23 RX ADMIN — Medication 2 MG: at 14:07

## 2017-08-23 RX ADMIN — CEFAZOLIN 2 G: 1 INJECTION, POWDER, FOR SOLUTION INTRAMUSCULAR; INTRAVENOUS; PARENTERAL at 01:40

## 2017-08-23 RX ADMIN — INDAPAMIDE 1.25 MG: 2.5 TABLET, FILM COATED ORAL at 07:02

## 2017-08-23 RX ADMIN — DOCUSATE SODIUM 100 MG: 100 CAPSULE ORAL at 10:14

## 2017-08-23 RX ADMIN — LOSARTAN POTASSIUM 100 MG: 50 TABLET, FILM COATED ORAL at 23:13

## 2017-08-23 RX ADMIN — ONDANSETRON 4 MG: 2 INJECTION INTRAMUSCULAR; INTRAVENOUS at 16:03

## 2017-08-23 RX ADMIN — HYDROCODONE BITARTRATE AND ACETAMINOPHEN 1 TABLET: 10; 325 TABLET ORAL at 15:35

## 2017-08-23 RX ADMIN — FAMOTIDINE 20 MG: 20 TABLET, FILM COATED ORAL at 10:14

## 2017-08-23 RX ADMIN — DOCUSATE SODIUM 100 MG: 100 CAPSULE ORAL at 18:55

## 2017-08-23 RX ADMIN — Medication 10 ML: at 23:13

## 2017-08-23 RX ADMIN — Medication 10 ML: at 15:35

## 2017-08-23 RX ADMIN — EZETIMIBE 10 MG: 10 TABLET ORAL at 10:14

## 2017-08-23 RX ADMIN — FAMOTIDINE 20 MG: 20 TABLET, FILM COATED ORAL at 23:13

## 2017-08-23 RX ADMIN — ALLOPURINOL 300 MG: 300 TABLET ORAL at 23:13

## 2017-08-23 RX ADMIN — Medication 10 ML: at 07:03

## 2017-08-23 RX ADMIN — TAPENTADOL HYDROCHLORIDE 100 MG: 50 TABLET, FILM COATED ORAL at 17:09

## 2017-08-23 RX ADMIN — DEXAMETHASONE SODIUM PHOSPHATE 10 MG: 10 INJECTION, SOLUTION INTRAMUSCULAR; INTRAVENOUS at 17:05

## 2017-08-23 RX ADMIN — HYDROCODONE BITARTRATE AND ACETAMINOPHEN 1 TABLET: 10; 325 TABLET ORAL at 05:05

## 2017-08-23 RX ADMIN — SODIUM CHLORIDE 125 ML/HR: 900 INJECTION, SOLUTION INTRAVENOUS at 02:35

## 2017-08-23 RX ADMIN — NEBIVOLOL HYDROCHLORIDE 5 MG: 5 TABLET ORAL at 10:14

## 2017-08-23 NOTE — PROGRESS NOTES
Patient is reporting increased levels of pain after working with PT. He feels that the Jetty Aminata is not working well. Patient is unable to tolerate oxycodone or dilaudid. Both have caused nausea/vomiting in the past. He also reports pain radiating down into his groin and hip/outer thigh. Will do a one time dose of 10mg IV Decadron. Also, will discontinue the Norco and try Nucynta as the first line PO pain reliever. Tramadol and Valium are also ordered as needed and will remain unchanged. PRN IV morphine increased to 4mg q3 hours as needed for severe pain unrelieved by PO meds. Discussed plan with patient and his wife. Patient's RN also updated.     Danielle Patient, NP

## 2017-08-23 NOTE — PROGRESS NOTES
Problem: Mobility Impaired (Adult and Pediatric)  Goal: *Acute Goals and Plan of Care (Insert Text)  Physical Therapy Goals  Initiated 8/22/2017    1. Patient will move from supine to sit and sit to supine in bed with independence within 4 days. 2. Patient will perform sit to stand with modified independence within 4 days. 3. Patient will ambulate with modified independence for 150 feet with the least restrictive device within 4 days. 4. Patient will ascend/descend 4 stairs with 1 handrail(s) with minimal assistance/contact guard assist within 4 days. 5. Patient will verbalize and demonstrate understanding of spinal precautions (No bending, lifting greater than 5 lbs, or twisting; log-roll technique; frequent repositioning as instructed) within 4 days. PHYSICAL THERAPY TREATMENT  Patient: Cesar Carlin (3 y.o. male)  Date: 8/23/2017  Diagnosis: Lumbar stenosis [M48.06] <principal problem not specified>  Procedure(s) (LRB):  L3-L4 LAMINECTOMY, FUSION WITH INSTRUMENTATION, TLIF, BONEGRAFT (N/A) 2 Days Post-Op  Precautions: Fall, Spinal (brace when OOB)      ASSESSMENT:  Patient in bathroom upon arrival.  Wife requests a conversation with case management in order to rent a hospital bed. Patient reports high pain level 7-8/10. Pain is sharp and centrally located across low back. Patient transfers off toilet max A x 1 with grab bar; ambulates 20' with RW although needs mod A to aid with RW turns due to decreased ability to un weight hands due to pain. Patient requiring a lot of UE support with RW. Patient returned to bed min A upper body and max A lower body to get legs up on the bed. Log roll to supine. Patient scooted up in bed total A x 2. Patient's treatment session today limited by progression of pain which was noted this am as well. Patient will benefit from skilled PT to advance functional mobility.      Progression toward goals:  [ ]      Improving appropriately and progressing toward goals  [X] Improving slowly and progressing toward goals  [ ]      Not making progress toward goals and plan of care will be adjusted       PLAN:  Patient continues to benefit from skilled intervention to address the above impairments. Continue treatment per established plan of care. Discharge Recommendations:  Home Health  Further Equipment Recommendations for Discharge:  Wife wants to rent a hospital bed       SUBJECTIVE:   Patient stated my back is really hurting   The patient stated 3/3 back precautions. Reviewed all 3 with patient. OBJECTIVE DATA SUMMARY:   Critical Behavior:  Neurologic State: Alert  Orientation Level: Oriented X4  Cognition: Appropriate decision making, Appropriate for age attention/concentration, Appropriate safety awareness, Follows commands     Functional Mobility Training:  Bed Mobility:  Log Rolling: Moderate assistance;Assist x1;Additional time  Supine to Sit: Moderate assistance;Assist x1;Additional time              Brace donned upon entry  Transfers:  Sit to Stand: Moderate assistance;Maximum assistance;Assist x1  Stand to Sit: Moderate assistance; Additional time;Assist x1        Bed to Chair: Minimum assistance;Assist x1;Additional time                    Balance:  Sitting: Intact; With support  Ambulation/Gait Training:  Distance (ft): 20 Feet (ft)  Assistive Device: Brace/Splint;Gait belt;Walker, rolling  Ambulation - Level of Assistance: Moderate assistance;Assist x1;Minimal assistance        Gait Abnormalities: Antalgic; Altered arm swing; Step to gait        Base of Support: Widened     Speed/Kimberly: Slow                                  Stairs:did not occur              Pain: reports 8/10                    Activity Tolerance:   Poor activity tolerance due to high pain  Please refer to the flowsheet for vital signs taken during this treatment.   After treatment:   [ ]  Patient left in no apparent distress sitting up in chair  [X]  Patient left in no apparent distress in bed  [X] Call bell left within reach  [X]  Nursing notified  [ ]  Caregiver present  [ ]  Bed alarm activated      COMMUNICATION/COLLABORATION:   The patients plan of care was discussed with: Registered Nurse     Pham Flynn DPT   Time Calculation: 15 mins

## 2017-08-23 NOTE — INTERDISCIPLINARY ROUNDS
[address]    Patient Information    Name: Charlie Reid  Age: 75 y.o. Admission Date: 8/21/2017  Surgery/Procedure: Procedure(s):  L3-L4 LAMINECTOMY, FUSION WITH INSTRUMENTATION, TLIF, BONEGRAFT  Attending Provider: Josse Hudson MD  Surgeon: Joanne Mosher   Problem List: Active Problems:    Lumbar stenosis with neurogenic claudication (8/21/2017)      During rounds the following quality care indicators and evidence based practices were addressed :       PT/OT: Patient mobility - Mod assist for bed mobility, Max assist to stand, walked to bathroom  Bed Mobility Training  Rolling: Moderate assistance, Assist x1, Additional time  Supine to Sit: Moderate assistance, Assist x1, Additional time  Transfer Training  Sit to Stand: Maximum assistance, Assist x1, Additional time  Stand to Sit: Maximum assistance, Assist x1, Additional time  Bed to Chair: Minimum assistance, Assist x1, Additional time      Gait Training  Assistive Device: Walker, rolling, Gait belt, Brace/Splint  Ambulation - Level of Assistance: Minimal assistance  Distance (ft): 10 Feet (ft)          Pain Assessment  Pain Intensity 1: 6 (08/22/17 8493)  Pain Location 1: Back  Pain Intervention(s) 1: Encouraged PCA  Patient Stated Pain Goal: 2    Pain meds: Norco, morphine  Antibiotics completed: No, Ancef ordered, drain in place  Anticoagulation:  none  Bowel regimen: colace  Mechanical DVT prophylaxis: SCDs  Alvarez: N   Goals For Today: Progress with PT, pain control    RRAT Score:      Readmit Risk Tool  Support Systems: Spouse/Significant Other/Partner    Discharge Management/Planning:    Readmit Risk Assessment Information:   Low Risk            7       Total Score        2 . Living with Significant Other. Assisted Living. LTAC. SNF. or   Rehab    5 Pt.  Coverage (Medicare=5 , Medicaid, or Self-Pay=4)        Criteria that do not apply:    Has Seen PCP in Last 6 Months (Yes=3, No=0)    Patient Length of Stay (>5 days = 3) IP Visits Last 12 Months (1-3=4, 4=9, >4=11)    Charlson Comorbidity Score (Age + Comorbid Conditions)         Readmit Risk Tool  Support Systems: Spouse/Significant Other/Partner    [id number]: At [address]:  Anticipated Date of Discharge: tomorrow/Friday  Barrier to Discharge (if any): n/a    Interdisciplinary team rounds were held with the following team members: Nurse Practitioner, Nursing, Pharmacy, and Rehab. See clinical pathway and/or care plan for interventions and desired outcomes.

## 2017-08-23 NOTE — PROGRESS NOTES
8/23/2017 4:41 PM [facility] cannot accept pt's hospital bed due to Medicare. CM called and lvm with Ирина Arrington [facility]. 8/23/2017 4:28 PM Pt's wife requesting hospital bed for pt at discharge. CM discussed it is unlikely it will be covered under insurance and discussed options for private pay rental. Pt's wife requested hospital bed be put through pt's insurance. CM sent order to [facility] [place] [facility] and [facility] for review. CM will follow up.  NORMAN Flowers

## 2017-08-23 NOTE — PROGRESS NOTES
ORTHOPAEDIC LUMBAR FUSION PROGRESS NOTE    NAME:     Domi Castle   :       1950   MRN:       [id number]   DATE:      2017    POD:    2 Days Post-Op  S/P:    Procedure(s):  L3-L4 LAMINECTOMY, FUSION WITH INSTRUMENTATION, TLIF, BONEGRAFT    SUBJECTIVE:    C/O back pain along surgical incision  No leg pain or numbness  Denies nausea/vomiting, chest pain, headache or shortness of breath  Pain controlled    Recent Labs      17   0229   HGB  10.0*   HCT  30.0*   NA  135*   K  3.5   CL  102   CO2  31   BUN  11   CREA  0.85   GLU  102*     Patient Vitals for the past 12 hrs:   BP Temp Pulse Resp SpO2   17 1540 109/66 98.7 °F (37.1 °C) 70 18 98 %   17 1142 94/57 98.7 °F (37.1 °C) 73 18 98 %   17 0745 115/76 98.7 °F (37.1 °C) 70 17 97 %       EXAM:  Dressings clean, dry and intact   Positive strength/ROM bilat lower ext.   Neuro intact to sensation  Calves, soft & nontender  BL LEs NVID      PLAN:  Continue PO pain medications  PT/OT, OOB  Advance regular diet      Candy Pelletier [place]  Orthopaedic Surgery  Physician Assistant to Dr. Srinivas Raphael

## 2017-08-23 NOTE — PROGRESS NOTES
Problem: Mobility Impaired (Adult and Pediatric)  Goal: *Acute Goals and Plan of Care (Insert Text)  Physical Therapy Goals  Initiated 8/22/2017    1. Patient will move from supine to sit and sit to supine in bed with independence within 4 days. 2. Patient will perform sit to stand with modified independence within 4 days. 3. Patient will ambulate with modified independence for 150 feet with the least restrictive device within 4 days. 4. Patient will ascend/descend 4 stairs with 1 handrail(s) with minimal assistance/contact guard assist within 4 days. 5. Patient will verbalize and demonstrate understanding of spinal precautions (No bending, lifting greater than 5 lbs, or twisting; log-roll technique; frequent repositioning as instructed) within 4 days. PHYSICAL THERAPY TREATMENT  Patient: Ko Hughes (38 y.o. male)  Date: 8/23/2017  Diagnosis: Lumbar stenosis [M48.06] <principal problem not specified>  Procedure(s) (LRB):  L3-L4 LAMINECTOMY, FUSION WITH INSTRUMENTATION, TLIF, BONEGRAFT (N/A) 2 Days Post-Op  Precautions: Fall, Spinal (brace when OOB)      ASSESSMENT:  Patient with increased pain levels this AM requiring more physical assistance compared to yesterday's session. He required MOD A x1 for bed mobility, MAX A x1 for sit-stand and MIN A for ambulation and transfer to sit up in chair. Patient with slow but steady progress. Patient states MD is planning on discharge for tomorrow. Progression toward goals:  [ ]      Improving appropriately and progressing toward goals  [X]      Improving slowly and progressing toward goals  [ ]      Not making progress toward goals and plan of care will be adjusted       PLAN:  Patient continues to benefit from skilled intervention to address the above impairments. Continue treatment per established plan of care.   Discharge Recommendations:  Home Health  Further Equipment Recommendations for Discharge:  rolling walker       SUBJECTIVE:   Patient stated .   The patient stated 3/3 back precautions. Reviewed all 3 with patient. OBJECTIVE DATA SUMMARY:   Critical Behavior:  Neurologic State: Alert  Orientation Level: Oriented X4  Cognition: Appropriate decision making, Appropriate for age attention/concentration, Appropriate safety awareness, Follows commands     Functional Mobility Training:  Bed Mobility:  Log Rolling: Moderate assistance;Assist x1;Additional time  Supine to Sit: Moderate assistance;Assist x1;Additional time              Brace donned with  supervision/set-up   Transfers:  Sit to Stand: Maximum assistance;Assist x1;Additional time  Stand to Sit: Maximum assistance;Assist x1;Additional time        Bed to Chair: Minimum assistance;Assist x1;Additional time                    Balance:     Ambulation/Gait Training:  Distance (ft): 10 Feet (ft)  Assistive Device: Walker, rolling;Gait belt;Brace/Splint  Ambulation - Level of Assistance: Minimal assistance        Gait Abnormalities: Step to gait; Decreased step clearance; Antalgic                                                 Stairs: Therapeutic Exercises:      Pain:  Pain Scale 1: Numeric (0 - 10)  Pain Intensity 1: 6              Activity Tolerance:   Good- no complications with upright activity  Please refer to the flowsheet for vital signs taken during this treatment.   After treatment:   [X]  Patient left in no apparent distress sitting up in chair  [ ]  Patient left in no apparent distress in bed  [X]  Call bell left within reach  [X]  Nursing notified  [ ]  Caregiver present  [X]  Chair alarm activated      COMMUNICATION/COLLABORATION:   The patients plan of care was discussed with: Registered Nurse     Graham Hardin PT, DPT   Time Calculation: 24 mins

## 2017-08-23 NOTE — PROGRESS NOTES
Bedside and Verbal shift change report given to Fam Street RN (oncoming nurse) by PRITESH CODY (offgoing nurse). Report included the following information SBAR, Kardex, OR Summary, Procedure Summary, Intake/Output and MAR.

## 2017-08-23 NOTE — PROGRESS NOTES
Occupational Therapy Note:    Reviewed chart and spoke with pt's nurse. Attempted to see pt for OT session. Pt received supine in bed, wife at bedside. Pt with eyes closed attempting to rest. His wife reported that pt has been in pain and having nausea today, and RN had recently administered medication to address nausea. Pt and his wife requesting therapy be deferred at this time until pt is feeling more able to participate. Will continue to follow and attempt again tomorrow. Thank you.     Mohan Storm

## 2017-08-24 LAB
ANION GAP SERPL CALC-SCNC: 3 MMOL/L (ref 5–15)
BUN SERPL-MCNC: 12 MG/DL (ref 6–20)
BUN/CREAT SERPL: 15 (ref 12–20)
CALCIUM SERPL-MCNC: 8.7 MG/DL (ref 8.5–10.1)
CHLORIDE SERPL-SCNC: 104 MMOL/L (ref 97–108)
CO2 SERPL-SCNC: 30 MMOL/L (ref 21–32)
CREAT SERPL-MCNC: 0.8 MG/DL (ref 0.7–1.3)
ERYTHROCYTE [DISTWIDTH] IN BLOOD BY AUTOMATED COUNT: 13.2 % (ref 11.5–14.5)
GLUCOSE SERPL-MCNC: 129 MG/DL (ref 65–100)
HCT VFR BLD AUTO: 32 % (ref 36.6–50.3)
HGB BLD-MCNC: 11.1 G/DL (ref 12.1–17)
MCH RBC QN AUTO: 31.7 PG (ref 26–34)
MCHC RBC AUTO-ENTMCNC: 34.7 G/DL (ref 30–36.5)
MCV RBC AUTO: 91.4 FL (ref 80–99)
PLATELET # BLD AUTO: 200 K/UL (ref 150–400)
POTASSIUM SERPL-SCNC: 4.3 MMOL/L (ref 3.5–5.1)
RBC # BLD AUTO: 3.5 M/UL (ref 4.1–5.7)
SODIUM SERPL-SCNC: 137 MMOL/L (ref 136–145)
WBC # BLD AUTO: 7.2 K/UL (ref 4.1–11.1)

## 2017-08-24 PROCEDURE — 36415 COLL VENOUS BLD VENIPUNCTURE: CPT | Performed by: PHYSICIAN ASSISTANT

## 2017-08-24 PROCEDURE — 77030027138 HC INCENT SPIROMETER -A

## 2017-08-24 PROCEDURE — 74011250637 HC RX REV CODE- 250/637: Performed by: NURSE PRACTITIONER

## 2017-08-24 PROCEDURE — 74011250636 HC RX REV CODE- 250/636: Performed by: NURSE PRACTITIONER

## 2017-08-24 PROCEDURE — 74011250637 HC RX REV CODE- 250/637: Performed by: ORTHOPAEDIC SURGERY

## 2017-08-24 PROCEDURE — 65270000029 HC RM PRIVATE

## 2017-08-24 PROCEDURE — 85027 COMPLETE CBC AUTOMATED: CPT | Performed by: PHYSICIAN ASSISTANT

## 2017-08-24 PROCEDURE — 97535 SELF CARE MNGMENT TRAINING: CPT

## 2017-08-24 PROCEDURE — 80048 BASIC METABOLIC PNL TOTAL CA: CPT | Performed by: PHYSICIAN ASSISTANT

## 2017-08-24 PROCEDURE — 97116 GAIT TRAINING THERAPY: CPT

## 2017-08-24 RX ADMIN — TAPENTADOL HYDROCHLORIDE 100 MG: 50 TABLET, FILM COATED ORAL at 21:46

## 2017-08-24 RX ADMIN — TAPENTADOL HYDROCHLORIDE 100 MG: 50 TABLET, FILM COATED ORAL at 05:30

## 2017-08-24 RX ADMIN — FAMOTIDINE 20 MG: 20 TABLET, FILM COATED ORAL at 21:02

## 2017-08-24 RX ADMIN — CEFAZOLIN 2 G: 1 INJECTION, POWDER, FOR SOLUTION INTRAMUSCULAR; INTRAVENOUS; PARENTERAL at 09:39

## 2017-08-24 RX ADMIN — DIAZEPAM 5 MG: 5 TABLET ORAL at 01:42

## 2017-08-24 RX ADMIN — FAMOTIDINE 20 MG: 20 TABLET, FILM COATED ORAL at 09:39

## 2017-08-24 RX ADMIN — TAPENTADOL HYDROCHLORIDE 100 MG: 50 TABLET, FILM COATED ORAL at 09:39

## 2017-08-24 RX ADMIN — DOCUSATE SODIUM 100 MG: 100 CAPSULE ORAL at 17:50

## 2017-08-24 RX ADMIN — Medication 10 ML: at 05:31

## 2017-08-24 RX ADMIN — ALLOPURINOL 300 MG: 300 TABLET ORAL at 21:02

## 2017-08-24 RX ADMIN — DOCUSATE SODIUM 100 MG: 100 CAPSULE ORAL at 09:39

## 2017-08-24 RX ADMIN — Medication 10 ML: at 13:45

## 2017-08-24 RX ADMIN — LOSARTAN POTASSIUM 100 MG: 50 TABLET, FILM COATED ORAL at 21:02

## 2017-08-24 RX ADMIN — ATORVASTATIN CALCIUM 40 MG: 10 TABLET, FILM COATED ORAL at 21:02

## 2017-08-24 RX ADMIN — CEFAZOLIN 2 G: 1 INJECTION, POWDER, FOR SOLUTION INTRAMUSCULAR; INTRAVENOUS; PARENTERAL at 01:42

## 2017-08-24 RX ADMIN — EZETIMIBE 10 MG: 10 TABLET ORAL at 09:39

## 2017-08-24 RX ADMIN — NEBIVOLOL HYDROCHLORIDE 5 MG: 5 TABLET ORAL at 09:39

## 2017-08-24 RX ADMIN — TAPENTADOL HYDROCHLORIDE 100 MG: 50 TABLET, FILM COATED ORAL at 13:45

## 2017-08-24 RX ADMIN — INDAPAMIDE 1.25 MG: 2.5 TABLET, FILM COATED ORAL at 06:32

## 2017-08-24 RX ADMIN — TAPENTADOL HYDROCHLORIDE 100 MG: 50 TABLET, FILM COATED ORAL at 17:50

## 2017-08-24 NOTE — PROGRESS NOTES
Problem: Mobility Impaired (Adult and Pediatric)  Goal: *Acute Goals and Plan of Care (Insert Text)  Physical Therapy Goals  Initiated 8/22/2017    1. Patient will move from supine to sit and sit to supine in bed with independence within 4 days. 2. Patient will perform sit to stand with modified independence within 4 days. 3. Patient will ambulate with modified independence for 150 feet with the least restrictive device within 4 days. 4. Patient will ascend/descend 4 stairs with 1 handrail(s) with minimal assistance/contact guard assist within 4 days. 5. Patient will verbalize and demonstrate understanding of spinal precautions (No bending, lifting greater than 5 lbs, or twisting; log-roll technique; frequent repositioning as instructed) within 4 days. PHYSICAL THERAPY TREATMENT  Patient: Patrick Balderrama (27 y.o. male)  Date: 8/24/2017  Diagnosis: Lumbar stenosis [M48.06] <principal problem not specified>  Procedure(s) (LRB):  L3-L4 LAMINECTOMY, FUSION WITH INSTRUMENTATION, TLIF, BONEGRAFT (N/A) 3 Days Post-Op  Precautions: Fall, Spinal (brace when OOB)      ASSESSMENT:  Patient with improved pain management today, less assistance required for mobility. He was able to increase his ambulation distance to 50 feet with RW, CGA. Bed mobility with MIN A, and tranfers with MDO A for sit-stand. Patient returned to sitting up in chair. Progress to increased ambulation distance today, and potential stairs. Patient states he is being discharged tomorrow. Progression toward goals:  [ ]      Improving appropriately and progressing toward goals  [X]      Improving slowly and progressing toward goals  [ ]      Not making progress toward goals and plan of care will be adjusted       PLAN:  Patient continues to benefit from skilled intervention to address the above impairments. Continue treatment per established plan of care.   Discharge Recommendations:  Home Health  Further Equipment Recommendations for Discharge:  RW- delivered       SUBJECTIVE:   Patient stated i am better today.    The patient stated 3/3 back precautions. Reviewed all 3 with patient. OBJECTIVE DATA SUMMARY:   Critical Behavior:  Neurologic State: Alert  Orientation Level: Oriented X4  Cognition: Appropriate decision making, Appropriate for age attention/concentration, Appropriate safety awareness, Follows commands     Functional Mobility Training:  Bed Mobility:  Log Rolling: Minimum assistance  Supine to Sit: Minimum assistance              Brace donned with  modified independence   Transfers:  Sit to Stand: Moderate assistance  Stand to Sit: Minimum assistance                             Balance:     Ambulation/Gait Training:  Distance (ft): 50 Feet (ft)  Assistive Device: Walker, rolling;Gait belt;Brace/Splint  Ambulation - Level of Assistance: Contact guard assistance                               Stairs: Therapeutic Exercises:      Pain:  Pain Scale 1: Numeric (0 - 10)  Pain Intensity 1: 2  Pain Location 1: Back  Pain Orientation 1: Lower  Pain Description 1: Aching;Burning  Pain Intervention(s) 1: Medication (see MAR)  Activity Tolerance:   Good- no complications with upright activity  Please refer to the flowsheet for vital signs taken during this treatment.   After treatment:   [X]  Patient left in no apparent distress sitting up in chair  [ ]  Patient left in no apparent distress in bed  [X]  Call bell left within reach  [X]  Nursing notified  [X]  Caregiver present  [X]  Chair alarm activated      COMMUNICATION/COLLABORATION:   The patients plan of care was discussed with: Registered Nurse     Kenyetta Ceja PT, DPT   Time Calculation: 14 mins

## 2017-08-24 NOTE — PROGRESS NOTES
ORTHOPAEDIC LUMBAR FUSION PROGRESS NOTE    NAME:     Gerald Madrigal   :       1950   MRN:       [id number]   DATE:      2017    POD:    3 Days Post-Op  S/P:    Procedure(s):  L3-L4 LAMINECTOMY, FUSION WITH INSTRUMENTATION, TLIF, BONEGRAFT    SUBJECTIVE:    C/O back pain along surgical incision, having 2/10 radiating pain to posterior bilateral thighs (tolerable, improved after decadron given yesterday)  No leg numbness  Denies nausea/vomiting, chest pain, headache or shortness of breath  Pain controlled    Recent Labs      17   0131   HGB  11.1*   HCT  32.0*   NA  137   K  4.3   CL  104   CO2  30   BUN  12   CREA  0.80   GLU  129*     Patient Vitals for the past 12 hrs:   BP Temp Pulse Resp SpO2   17 0331 140/77 97.8 °F (36.6 °C) 63 18 96 %   17 2314 145/73 97.6 °F (36.4 °C) 67 18 96 %       EXAM:  Dressings clean, dry and intact   Positive strength/ROM bilat lower ext.   Neuro intact to sensation  Calves, soft & nontender  BL LEs NVID      PLAN:  Continue PO pain medications  PT/OT, OOB  Advance regular diet      Emi Hudson [place]  Orthopaedic Surgery  Physician Assistant to Dr. Tona Cade

## 2017-08-24 NOTE — PROGRESS NOTES
8/24/2017 3:42 PM Called and lvm with Anny Dias at [facility] regarding pt's hospital bed. CM will follow up.     8/24/2017 10:50 AM LMN typed and order signed by Ortho NP sent to [facility] via [employer]. CM will follow up.  NORMAN Villarreal

## 2017-08-24 NOTE — PROGRESS NOTES
Patient with 10/10 pain    1355 Called away from lunch by PhysTherapystating patient reporting \"excruciating pain. \"  1407 Administered 2mg IV morphine per order see MAR patient reporting 10/10 pain  1535 Reassessed to find patient still 8/10 pain. Administered Valium and Norco per order (see MAR)  1600 Returned to follow up patient still in severe pain with returning nausea. Administered Zofran per order (see MAR)  [phone] Returned to find patient lying on side looking rigid as though afraid to move.  Notified NP of uncontrolled pain

## 2017-08-24 NOTE — PROGRESS NOTES
Bedside and Verbal shift change report given to Shorty Quinteros RN (oncoming nurse) by Bethanie Morejon RN (offgoing nurse). Report included the following information SBAR, Kardex, OR Summary, Procedure Summary, Intake/Output, MAR and Recent Results.

## 2017-08-24 NOTE — PROGRESS NOTES
Problem: Self Care Deficits Care Plan (Adult)  Goal: *Acute Goals and Plan of Care (Insert Text)  Occupational Therapy Goals  Initiated 8/22/2017    1. Patient will perform toilet transfer with modified independence using most appropriate DME within 7 days. 2. Patient will perform lower body dressing with modified independence using most appropriate AE within 7 days. 3. Patient will stand to perform 3 grooming tasks at modified independence within 7 days. 4. Patient will don/doff back brace at modified independence within 7 days. 5. Patient will verbalize/demonstrate 3/3 back precautions during ADL tasks without cues within 7 days. OCCUPATIONAL THERAPY TREATMENT  Patient: Ko Hughes (20 y.o. male)  Date: 8/24/2017  Diagnosis: Lumbar stenosis [M48.06] <principal problem not specified>  Procedure(s) (LRB):  L3-L4 LAMINECTOMY, FUSION WITH INSTRUMENTATION, TLIF, BONEGRAFT (N/A) 3 Days Post-Op  Precautions: Fall, Spinal (brace when OOB) No bending, no lifting greater than 5 lbs, no twisting, log-roll technique, repositioning every 20-30 min except when sleeping, LSO brace when OOB      ASSESSMENT:  Cleared by RN to see pt for therapy session. Pt received seated in chair, wife present. Reviewed pt's back precautions, and pt recalled 3/3 without prompting. Educated pt on use of LB adaptive equipment, and pt performed LB dressing tasks with supervision using equipment. Pt initially fearful of taking single hand off walker to pull up pants, but pt progressed during session and demonstrated no difficulty managing clothing during toileting. Pt donned shirt with supervision, and doffed/donned back brace with supervision. He demonstrated good understanding of technique to don/doff brace. Pt stood to RW with min A and ambulated to bathroom with CGA, performed toilet transfer with min A. Pt stood at sink with supervision to perform brief grooming tasks.  Throughout session pt demonstrated safe use of RW and no LOB with functional mobility. Discussed tub transfer techniques (including DME) logroll technique for bed mobility, and pressure relief with pt and wife, and they verbalized understanding. Pt is progressing well, and will benefit from one more OT session to increase independence with ADLs and functional transfers prior to returning home. Recommend HHOT upon discharge. Progression toward goals:  [X]          Improving appropriately and progressing toward goals  [ ]          Improving slowly and progressing toward goals  [ ]          Not making progress toward goals and plan of care will be adjusted       PLAN:  Patient continues to benefit from skilled intervention to address the above impairments. Continue treatment per established plan of care. Discharge Recommendations:  Home Health  Further Equipment Recommendations for Discharge:  LB adaptive equipment, shower chair/tub bench       SUBJECTIVE:   Patient stated I think this will be easier with shorts rather than pants.   The patient stated 3/3 back precautions. Reviewed all 3 with patient. OBJECTIVE DATA SUMMARY:   Cognitive/Behavioral Status:  Neurologic State: Alert  Orientation Level: Oriented X4  Cognition: Follows commands  Safety/Judgement: Fall prevention, Good awareness of safety precautions, Home safety     Functional Mobility and Transfers for ADLs:  Bed Mobility:  Rolling: Minimum assistance  Supine to Sit: Minimum assistance     Transfers:  Sit to Stand: Minimum assistance  Functional Transfers  Toilet Transfer : Minimum assistance     Balance:  Sitting: Intact; Without support  Standing: Intact; With support (RW)     ADL Intervention and Instruction:   Educated pt on use of reacher, sock aid, and long handle shoe horn for LB dressing, and pt donned underwear, pants and socks with supervision.   Pt initially fearful of taking single hand off walker to pull up pants, but pt progressed during session and demonstrated no difficulty managing clothing during toileting. Pt donned shirt with independence, and donned/doffed brace with supervision. Pt performed toileting with supervision, and stood to wash hands at sink with supervision. Discussed tub transfer techniques (including DME) logroll technique for bed mobility, and pressure relief with pt and wife, and they verbalized understanding. Grooming  Grooming Assistance: Supervision/set up (in standing)  Washing Hands: Supervision/set-up        Upper Body Dressing Assistance  Orthotics(Brace): Supervision/set-up  Front Opened Shirt: Independent     Lower Body Dressing Assistance  Underpants: Supervision/set-up  Pants With Button/Zipper: Supervision/set-up  Socks: Supervision/set-up     Toileting  Bladder Hygiene: Supervision/set-up     Cognitive Retraining  Safety/Judgement: Fall prevention;Good awareness of safety precautions; Home safety  Bathing: Patient instructed and indicated understanding when bathing to not submerge wound in water, stand to shower or sponge bathe, cover wound with plastic and tape to ensure no water reaches bandage/wound without cues. Dressing brace: Patient instructed and demonstrated to don/doff velcro on brace using dominant side, keeping non-dominant side intact. Patient instructed and demonstrated in meantime of being able to stand with back against wall to don/doff brace, to don/doff seated using lap and bed/chair surface to support brace while manipulating. Dressing lower body: Patient instructed to don brace first and on the benefits to remain seated to don all clothing to increase independence with precautions and pain management. Toileting: Patient instructed on the benefits of using flushable wet wipes and toilet tongs if decreased reach or pain for praveen care. Also, the benefits of a reacher to aid in clothing management.    Home safety: Patient instructed and indicated understanding on home modifications and safety (raise height of ADL objects, appropriate height of chair surfaces, recliner safety, change of floor surfaces, clear pathways) to increase independence and fall prevention. Standing: Patient instructed and indicated understanding to walk up to sink/counter top/surfaces, step into walker, square off while using objects, slide objects along surfaces, to increase adherence to back precautions and fall prevention. Patient instructed to increase amount of time standing in order to increase independence and tolerance with ADLs. During prolonged standing, can open cabinet door or place foot on stool to decrease spinal pressure/increase pain. Tub transfer: Patient instructed and indicated understanding regarding when it is safe to begin transfer into tub (complete stairs with PT, advance exercises with PT high enough to clear tub height, and while clothes donned practice with another person present). Patient instructed and indicated understanding to use the same technique as used with stairs when entering and exiting tub (\"up with good leg, down with bad leg\"). Patient instructed and indicated understanding the benefits of maintaining activity tolerance, functional mobility, and independence with self care tasks during acute stay  to ensure safe return home and to baseline. Encouraged patient to increase frequency and duration OOB, not sitting longer than 30 mins without marching/walking with staff, be out of bed for all meals, perform daily ADLs (as approved by RN/MD regarding bathing etc), and performing functional mobility to/from bathroom. Patient instruction and indicated understanding on body mechanics, ergonomics and gravitational force on the spine during different body positions to plan activities in prep for return home to complete instrumental ADLs and back to work safely.    Pain:  Pain Scale 1: Numeric (0 - 10)  Pain Intensity 1: 2  Pain Location 1: Back  Pain Orientation 1: Lower  Pain Description 1: Aching;Burning  Pain Intervention(s) 1: Medication (see MAR)  Activity Tolerance:   Good  Please refer to the flowsheet for vital signs taken during this treatment.   After treatment:   [X] Patient left in no apparent distress sitting up in chair  [ ] Patient left in no apparent distress in bed  [X] Call bell left within reach  [X] Nursing notified  [X] Caregiver present- wife  [ ] Chair alarm activated      COMMUNICATION/COLLABORATION:   The patients plan of care was discussed with: Registered Nurse     MONA Yusuf  Time Calculation: 41 mins

## 2017-08-24 NOTE — PROGRESS NOTES
Problem: Falls - Risk of  Goal: *Absence of Falls  Document Scot Fall Risk and appropriate interventions in the flowsheet.    Outcome: Progressing Towards Goal  Fall Risk Interventions:  Mobility Interventions: OT consult for ADLs, PT Consult for mobility concerns, PT Consult for assist device competence           Medication Interventions: Bed/chair exit alarm, Patient to call before getting OOB, Teach patient to arise slowly     Elimination Interventions: Bed/chair exit alarm

## 2017-08-24 NOTE — PROGRESS NOTES
Problem: Mobility Impaired (Adult and Pediatric)  Goal: *Acute Goals and Plan of Care (Insert Text)  Physical Therapy Goals  Initiated 8/22/2017    1. Patient will move from supine to sit and sit to supine in bed with independence within 4 days. 2. Patient will perform sit to stand with modified independence within 4 days. 3. Patient will ambulate with modified independence for 150 feet with the least restrictive device within 4 days. 4. Patient will ascend/descend 4 stairs with 1 handrail(s) with minimal assistance/contact guard assist within 4 days. 5. Patient will verbalize and demonstrate understanding of spinal precautions (No bending, lifting greater than 5 lbs, or twisting; log-roll technique; frequent repositioning as instructed) within 4 days. PHYSICAL THERAPY TREATMENT  Patient: Mame Ennis (16 y.o. male)  Date: 8/24/2017  Diagnosis: Lumbar stenosis [M48.06] <principal problem not specified>  Procedure(s) (LRB):  L3-L4 LAMINECTOMY, FUSION WITH INSTRUMENTATION, TLIF, BONEGRAFT (N/A) 3 Days Post-Op  Precautions: Fall, Spinal (brace when OOB)      ASSESSMENT:  Patient able to increase his ambulation distance to 150 feet with RW and CGA. No loss of balance or instability. Improved activity tolerance and self-reports pain at 3/10. Patient will be ready for stair training in the AM.   Progression toward goals:  [ ]    Improving appropriately and progressing toward goals  [X]    Improving slowly and progressing toward goals  [ ]    Not making progress toward goals and plan of care will be adjusted       PLAN:  Patient continues to benefit from skilled intervention to address the above impairments. Continue treatment per established plan of care. Discharge Recommendations:  Home Health  Further Equipment Recommendations for Discharge:  rolling walker       SUBJECTIVE:   Patient stated Richad Shadow have been sitting up all morning, can we go back to bed.       OBJECTIVE DATA SUMMARY:   Critical Behavior:  Neurologic State: Alert  Orientation Level: Oriented X4  Cognition: Follows commands  Safety/Judgement: Fall prevention, Good awareness of safety precautions, Home safety  Functional Mobility Training:  Bed Mobility:  Rolling: Minimum assistance  Supine to Sit: Minimum assistance  Sit to Supine: Supervision           Transfers:  Sit to Stand: Minimum assistance  Stand to Sit: Minimum assistance                             Balance:  Sitting: Intact; Without support  Standing: Intact; With support (RW)  Ambulation/Gait Training:  Distance (ft): 150 Feet (ft)  Assistive Device: Walker, rolling;Gait belt  Ambulation - Level of Assistance: Contact guard assistance                               Stairs:                       Neuro Re-Education:     Therapeutic Exercises:      Pain:  Pain Scale 1: Numeric (0 - 10)  Pain Intensity 1: 3  Pain Location 1: Back  Pain Orientation 1: Lower  Pain Description 1: Aching;Burning  Pain Intervention(s) 1: Medication (see MAR)  Activity Tolerance:   Good- no complications with upright activity  Please refer to the flowsheet for vital signs taken during this treatment.   After treatment:   [ ]    Patient left in no apparent distress sitting up in chair  [X]    Patient left in no apparent distress in bed  [X]    Call bell left within reach  [X]    Nursing notified  [ ]    Caregiver present  [ ]    Bed alarm activated      COMMUNICATION/COLLABORATION:   The patients plan of care was discussed with: Registered Nurse     Robert Stanton, PT, DPT   Time Calculation: 20 mins

## 2017-08-24 NOTE — PROGRESS NOTES
[address]    Patient Information    Name: Luis E Huynh  Age: 75 y.o. Admission Date: 8/21/2017  Surgery/Procedure: Procedure(s):  L3-L4 LAMINECTOMY, FUSION WITH INSTRUMENTATION, TLIF, BONEGRAFT  Attending Provider: Cristal Lincoln MD  Surgeon: Christopher Snow   Problem List: Active Problems:    Lumbar stenosis with neurogenic claudication (8/21/2017)      During rounds the following quality care indicators and evidence based practices were addressed :       PT/OT: Patient mobility - Walked 48' with PT, OT in room now  Bed Mobility Training  Rolling: Minimum assistance  Supine to Sit: Minimum assistance  Transfer Training  Sit to Stand: Moderate assistance  Stand to Sit: Minimum assistance  Bed to Chair: Minimum assistance, Assist x1, Additional time      Gait Training  Assistive Device: Walker, rolling, Gait belt, Brace/Splint  Ambulation - Level of Assistance: Contact guard assistance  Distance (ft): 50 Feet (ft)          Pain Assessment  Pain Intensity 1: 2 (08/24/17 1030)  Pain Location 1: Back  Pain Intervention(s) 1: Medication (see MAR)  Patient Stated Pain Goal: 2    Pain meds: nucynta  Antibiotics completed: Yes  Anticoagulation:  none    SCDs: in place  Bowels:     RRAT Score:      Readmit Risk Tool  Support Systems: Spouse/Significant Other/Partner  Relationship with Primary Physician Group: Seen at least one time within the past 6 months    Discharge Management/Planning:    Readmit Risk Assessment Information:   Low Risk            10       Total Score        3 Has Seen PCP in Last 6 Months (Yes=3, No=0)    2 . Living with Significant Other. Assisted Living. LTAC. SNF. or   Rehab    5 Pt.  Coverage (Medicare=5 , Medicaid, or Self-Pay=4)        Criteria that do not apply:    Patient Length of Stay (>5 days = 3)    IP Visits Last 12 Months (1-3=4, 4=9, >4=11)    Charlson Comorbidity Score (Age + Comorbid Conditions)         Readmit Risk Tool  Support Systems: Spouse/Significant Other/Partner  Relationship with Primary Physician Group: Seen at least one time within the past 6 months    [facility] [place]:     Anticipated Date of Discharge: tomorrow    Interdisciplinary team rounds were held with the following team members: Nurse Practitioner, Case Management, Nursing, Pharmacy, and Rehab. See clinical pathway and/or care plan for interventions and desired outcomes.

## 2017-08-25 VITALS
HEART RATE: 65 BPM | BODY MASS INDEX: 25.73 KG/M2 | TEMPERATURE: 98.7 F | HEIGHT: 69 IN | OXYGEN SATURATION: 97 % | WEIGHT: 173.72 LBS | DIASTOLIC BLOOD PRESSURE: 72 MMHG | SYSTOLIC BLOOD PRESSURE: 118 MMHG | RESPIRATION RATE: 18 BRPM

## 2017-08-25 LAB
ANION GAP SERPL CALC-SCNC: 3 MMOL/L (ref 5–15)
BUN SERPL-MCNC: 13 MG/DL (ref 6–20)
BUN/CREAT SERPL: 18 (ref 12–20)
CALCIUM SERPL-MCNC: 8.7 MG/DL (ref 8.5–10.1)
CHLORIDE SERPL-SCNC: 103 MMOL/L (ref 97–108)
CO2 SERPL-SCNC: 31 MMOL/L (ref 21–32)
CREAT SERPL-MCNC: 0.73 MG/DL (ref 0.7–1.3)
ERYTHROCYTE [DISTWIDTH] IN BLOOD BY AUTOMATED COUNT: 13.3 % (ref 11.5–14.5)
GLUCOSE SERPL-MCNC: 96 MG/DL (ref 65–100)
HCT VFR BLD AUTO: 31.9 % (ref 36.6–50.3)
HGB BLD-MCNC: 10.8 G/DL (ref 12.1–17)
MCH RBC QN AUTO: 31.5 PG (ref 26–34)
MCHC RBC AUTO-ENTMCNC: 33.9 G/DL (ref 30–36.5)
MCV RBC AUTO: 93 FL (ref 80–99)
PLATELET # BLD AUTO: 195 K/UL (ref 150–400)
POTASSIUM SERPL-SCNC: 3.5 MMOL/L (ref 3.5–5.1)
RBC # BLD AUTO: 3.43 M/UL (ref 4.1–5.7)
SODIUM SERPL-SCNC: 137 MMOL/L (ref 136–145)
WBC # BLD AUTO: 9 K/UL (ref 4.1–11.1)

## 2017-08-25 PROCEDURE — 74011250637 HC RX REV CODE- 250/637: Performed by: ORTHOPAEDIC SURGERY

## 2017-08-25 PROCEDURE — 74011250637 HC RX REV CODE- 250/637: Performed by: NURSE PRACTITIONER

## 2017-08-25 PROCEDURE — 97530 THERAPEUTIC ACTIVITIES: CPT

## 2017-08-25 PROCEDURE — 85027 COMPLETE CBC AUTOMATED: CPT | Performed by: PHYSICIAN ASSISTANT

## 2017-08-25 PROCEDURE — 36415 COLL VENOUS BLD VENIPUNCTURE: CPT | Performed by: PHYSICIAN ASSISTANT

## 2017-08-25 PROCEDURE — 80048 BASIC METABOLIC PNL TOTAL CA: CPT | Performed by: PHYSICIAN ASSISTANT

## 2017-08-25 PROCEDURE — 97116 GAIT TRAINING THERAPY: CPT

## 2017-08-25 RX ORDER — PROMETHAZINE HYDROCHLORIDE 25 MG/1
25 TABLET ORAL
Qty: 1 TAB | Refills: 0 | Status: SHIPPED
Start: 2017-08-25 | End: 2017-08-25

## 2017-08-25 RX ORDER — NALOXONE HYDROCHLORIDE 4 MG/.1ML
SPRAY NASAL
Qty: 2 ACTUATION(S) | Refills: 0 | Status: SHIPPED | OUTPATIENT
Start: 2017-08-25

## 2017-08-25 RX ORDER — PROMETHAZINE HYDROCHLORIDE 25 MG/1
25 TABLET ORAL
Qty: 30 TAB | Refills: 0 | Status: SHIPPED | OUTPATIENT
Start: 2017-08-25

## 2017-08-25 RX ORDER — DOCUSATE SODIUM 100 MG/1
100 CAPSULE, LIQUID FILLED ORAL 2 TIMES DAILY
Qty: 60 CAP | Refills: 2 | Status: SHIPPED
Start: 2017-08-25 | End: 2017-11-23

## 2017-08-25 RX ORDER — PROMETHAZINE HYDROCHLORIDE 25 MG/1
25 TABLET ORAL
Qty: 1 TAB | Refills: 0 | Status: SHIPPED | OUTPATIENT
Start: 2017-08-25 | End: 2017-08-25

## 2017-08-25 RX ORDER — DIAZEPAM 5 MG/1
5 TABLET ORAL
Qty: 20 TAB | Refills: 0 | Status: SHIPPED | OUTPATIENT
Start: 2017-08-25

## 2017-08-25 RX ADMIN — Medication 10 ML: at 06:32

## 2017-08-25 RX ADMIN — DIAZEPAM 5 MG: 5 TABLET ORAL at 08:00

## 2017-08-25 RX ADMIN — FAMOTIDINE 20 MG: 20 TABLET, FILM COATED ORAL at 08:00

## 2017-08-25 RX ADMIN — NEBIVOLOL HYDROCHLORIDE 5 MG: 5 TABLET ORAL at 08:00

## 2017-08-25 RX ADMIN — TAPENTADOL HYDROCHLORIDE 100 MG: 50 TABLET, FILM COATED ORAL at 06:31

## 2017-08-25 RX ADMIN — INDAPAMIDE 1.25 MG: 2.5 TABLET, FILM COATED ORAL at 06:32

## 2017-08-25 RX ADMIN — EZETIMIBE 10 MG: 10 TABLET ORAL at 08:00

## 2017-08-25 RX ADMIN — DOCUSATE SODIUM 100 MG: 100 CAPSULE ORAL at 08:00

## 2017-08-25 RX ADMIN — TAPENTADOL HYDROCHLORIDE 100 MG: 50 TABLET, FILM COATED ORAL at 01:45

## 2017-08-25 RX ADMIN — TAPENTADOL HYDROCHLORIDE 100 MG: 50 TABLET, FILM COATED ORAL at 10:43

## 2017-08-25 NOTE — DISCHARGE INSTRUCTIONS
Lumbar Spinal Surgery Discharge Instructions   Dr. Argenis Joshua  [phone]    Activity:    Patrizia Pepper You are going home a well person, be as active as possible. Your only exercise should be walking. Start with short frequent walks and increase your walking distance each day. Start with walking twice a day for 5 minutes and increase your distance each day 2-3 minutes until you reach 25 minutes twice a day. Limit the amount of time you sit to 20-30 minute intervals. Sitting for prolonged periods of time will be uncomfortable for you following your surgery.  No bending, lifting (of 5lbs or more), twisting, or straining.  Do not drive until your doctor states you may do so. However, you may ride in a car for short distances.  If you are required to wear a brace, you should wear it at all times when you are out of bed. You may remove it when sleeping unless your physician advises you against it.  When you are in the bed, you may lay on your back or on either side. Do not lie on your stomach.  You may resume sexual relations 4-6 weeks after surgery.  Continue to use your incentive spirometer for deep breathing exercises. Driving:   You may not drive or return to work until instructed by your physician. However, you may ride in the car for short periods of time. Brace:   If you have a back brace, you should wear your brace at all times when you are out of bed. Do not wear the brace while in bed or showering.  Remember to always wear a cotton t-shirt underneath your brace.  Do not bend or twist when your brace is off. Diet:   You may resume your regular home diet as tolerated. If your throat is sore, you should eat soft foods for the first couple of days.  Be sure to drink plenty of fluids; it is important to keep yourself hydrated.  Avoid alcoholic beverages and ABSOLUTELY NO tobacco products. Tobacco products will interfere with your healing.  If you continue to use tobacco, you may end up needing another surgery in the future. Dressing: You have on a Prineo dressing. This is a waterproof bacteriostatic dressing that requires no post-operative care. Please do not peel the dressing off, or apply any oil based products, as they may expedite the deterioration of the mesh. The dressing will slowly chip off on its own.  Do not rub or apply lotion or ointments to incision site. Shower:   You may shower 5 days after your surgery.  You may remove your brace during showers.  NO not use tub baths, swimming pools or Jacuzzis. Medications:   Check with your physician before taking any anti-inflammatory medications. (Advil, Aleve, Ibuprofen, Aspirin)   Take your pain medication as directed   Do NOT take additional Tylenol if your prescribed pain medication has acetaminophen in it (Endocet/Percocet, Lortab, Norco)   It is important to have regular bowel movements. Pain medications can be constipating. Stool softeners, warm prune juice, increasing your water intake, and increasing fiber in your diet can help in preventing constipation.  Do NOT take laxatives if at all possible except in severe situations. It can result in a vicious cycle of constipation and diarrhea. Stockings:   You have been given T.E.D. stockings to wear. Continue to wear these for 7 days after your discharge. Put them on in the morning and take them off at night. They are used to increase your circulation and prevent blood clots from forming in your legs. Follow-Up    Please call ASAP to schedule your 1st post-operative appointment. This should be approximately 3 weeks from your surgery date. Notify your physician in you develop any of the following conditions:   Fever above 101 degrees for 24 hours.  Nausea or vomiting.  Severe headache.    Inability to urinate   Loss of bowel or bladder function (sudden onset of incontinence)   Changes in sensation in your extremities (numbness, tingling, loss of color).  Severe pain or pain not relieved by medications.  Redness, swelling, or drainage from your incision.  Persistent pain in the chest.    Pain in the calf of either leg.  Increased weakness (if this is greater than before your surgery). If you have any questions about your dressing contact your Orthopaedic Surgeons office. Naloxone (Into the nose)   Naloxone (nal-OX-one)  Treats opioid overdose in an emergency situation. Must be given as soon as possible. Brand Name(s): Narcan   There may be other brand names for this medicine. When This Medicine Should Not Be Used: This medicine is not right for everyone. Do not use it if you had an allergic reaction to naloxone. How to Use This Medicine:   Spray  · Your doctor will tell you how much medicine to use. Do not use more than directed. · This medicine must be given to you (the patient) by someone else. Talk with people close to you so they know what to do in case of an emergency. · Read and follow the patient instructions that come with this medicine. Talk to your doctor or pharmacist if you have any questions. · This medicine is for use only in the nose. Do not get any of it in your eyes or on your skin. If it does get on these areas, rinse it off right away. · To use:   ¨ Each nasal spray contains only one dose of naloxone. Do not prime or test the nasal spray. ¨ Hold the nasal spray with your thumb on the bottom of the plunger and your first and middle fingers on either side of the nozzle. ¨ Lay the patient on his or her back. Support the patient's neck with your hand and let the head tilt back. ¨ Gently insert the tip of the nozzle into one nostril, until your fingers on either side of the nozzle are against the bottom of the patient's nose. ¨ Press the plunger firmly to give the dose. Remove the nasal spray from the patient's nose.   ¨ Move the patient on his or her side (recovery position). Get emergency medical help right away. ¨ Watch the patient closely. If needed, you may give more doses every 2 to 3 minutes until the patient responds. Use a new nasal spray for each dose and spray the medicine into the other nostril each time. · Store the medicine in a closed container at room temperature, away from heat, moisture, and direct light. Do not freeze. · Ask your pharmacist about the best way to dispose of medicine that you do not use. Drugs and Foods to Avoid:      Ask your doctor or pharmacist before using any other medicine, including over-the-counter medicines, vitamins, and herbal products. Warnings While Using This Medicine:   · Tell your doctor if you are pregnant or breastfeeding, or if you have heart or blood vessel disease. · This medicine should be given right away after a suspected or known overdose of an opioid (narcotic) medicine. This will help prevent serious breathing problems and severe sleepiness that can lead to death. · The effects of the opioid medicine may last longer than the effects of the naloxone. This means the breathing problems and sleepiness could come back. Always call for emergency help after the first dose of naloxone. · This medicine could cause withdrawal symptoms from the opioid medicine. · Keep all medicine out of the reach of children. Never share your medicine with anyone.   Possible Side Effects While Using This Medicine:   Call your doctor right away if you notice any of these side effects:  · Allergic reaction: Itching or hives, swelling in your face or hands, swelling or tingling in your mouth or throat, chest tightness, trouble breathing  · Crying more than the usual (in babies)  · Diarrhea, nausea, vomiting, stomach cramps  · Fast, pounding, or uneven heartbeat  · Fever, runny nose, sneezing, sweating, yawning  · Ongoing trouble breathing  · Seizure, shaking, or feeling restless, nervous, or irritable  If you notice these less serious side effects, talk with your doctor:   · Headache  · Joint or muscle pain  If you notice other side effects that you think are caused by this medicine, tell your doctor. Call your doctor for medical advice about side effects. You may report side effects to FDA at 6-198-WGK-1475  © 2017 [facility] Information is for End User's use only and may not be sold, redistributed or otherwise used for commercial purposes. The above information is an  only. It is not intended as medical advice for individual conditions or treatments. Talk to your doctor, nurse or pharmacist before following any medical regimen to see if it is safe and effective for you. MyChart Activation    Thank you for enrolling in [address] [facility]. Please follow the instructions below to securely access your online medical record. [employer]net allows you to send messages to your doctor, view your test results, renew your prescriptions, schedule appointments, and more. How Do I Sign Up? 1. In your internet browser, go to [email or web address]. 2. Click on the First Time User? Click Here link in the Sign In box. You will see the New Member Sign Up page. 3. Enter your [employer]mit[employer] Access Code exactly as it appears below. You will not need to use this code after youve completed the sign-up process. If you do not sign up before the expiration date, you must request a new code. [employer]mitnet Access Code: [email or web address]  Expires: 10/26/2017  8:28 AM     4. Enter the last four digits of your Social Security Number (xxxx) and Date of Birth (mm/dd/yyyy) as indicated and click Submit. You will be taken to the next sign-up page. 5. Create a Submitnet ID. This will be your Submitnet login ID and cannot be changed, so think of one that is secure and easy to remember. 6. Create a Submitnet password. You can change your password at any time. 7. Enter your Password Reset Question and Answer.  This can be used at a later time if you forget your password. 8. Enter your e-mail address. You will receive e-mail notification when new information is available in [address] 9. Click Sign Up. You can now view your medical record. Additional Information    Remember, [employer] is NOT to be used for urgent needs. For medical emergencies, dial 911. Now available from your iPhone and Android!

## 2017-08-25 NOTE — PROGRESS NOTES
8/25/2017 9:59 AM Spoke with Joni at [facility] who reported pt's hospital bed was approved and he will contact pt's wife for delivery of bed today. At 1 Manuela Pope was sent discharge clinicals and notified of pt's discharge today via [employer]. Pt and pt's wife are aware hospital bed was approved and will be delivered today. No further discharge needs identified.  JON BrowningW

## 2017-08-25 NOTE — PROGRESS NOTES
Problem: Mobility Impaired (Adult and Pediatric)  Goal: *Acute Goals and Plan of Care (Insert Text)  Physical Therapy Goals  Initiated 8/22/2017    1. Patient will move from supine to sit and sit to supine in bed with independence within 4 days. 2. Patient will perform sit to stand with modified independence within 4 days. 3. Patient will ambulate with modified independence for 150 feet with the least restrictive device within 4 days. 4. Patient will ascend/descend 4 stairs with 1 handrail(s) with minimal assistance/contact guard assist within 4 days. 5. Patient will verbalize and demonstrate understanding of spinal precautions (No bending, lifting greater than 5 lbs, or twisting; log-roll technique; frequent repositioning as instructed) within 4 days. PHYSICAL THERAPY TREATMENT  Patient: Charlie Reid (26 y.o. male)  Date: 8/25/2017  Diagnosis: Lumbar stenosis [M48.06] <principal problem not specified>  Procedure(s) (LRB):  L3-L4 LAMINECTOMY, FUSION WITH INSTRUMENTATION, TLIF, BONEGRAFT (N/A) 4 Days Post-Op  Precautions: Fall, Spinal (brace when OOB)      ASSESSMENT:  Pt received in chair,LSO in place, agreeable to participate with physical therapy. Reports 3/10 pain LB/ groin. CGA for sit<>stand using RW for steadying. Gait training using RW with CGA, demonstrates narrow ASHUTOSH, no LOB reports improved groin pain during mobility. Ascend/dscend 6 steps using single handrail on R with CGA. Pt verbalized and able to maintain back precautions with mobility. Pt requested to returned to chair. Pt verbalized understanding to use RW for gait and transfers. Pt is cleared for discharge from hospital from PT perspective.   Progression toward goals:  [X]      Improving appropriately and progressing toward goals  [ ]      Improving slowly and progressing toward goals  [ ]      Not making progress toward goals and plan of care will be adjusted       PLAN:  Patient continues to benefit from skilled intervention to address the above impairments. Continue treatment per established plan of care. Discharge Recommendations:  Home Health  Further Equipment Recommendations for Discharge:  none       SUBJECTIVE:   Patient stated I have a little more pain/discomfort today.    The patient stated 3/3 back precautions. Reviewed all 3 with patient. OBJECTIVE DATA SUMMARY:   Critical Behavior:  Neurologic State: Alert  Orientation Level: Oriented X4  Cognition: Follows commands  Safety/Judgement: Fall prevention, Good awareness of safety precautions, Home safety  Functional Mobility Training:  Bed Mobility:  Log                   Brace donned with  supervision/set-up   Transfers:  Sit to Stand: Contact guard assistance  Stand to Sit: Contact guard assistance                             Balance:  Sitting: Intact  Standing: Intact; With support  Ambulation/Gait Training:  Distance (ft): 150 Feet (ft)  Assistive Device: Walker, rolling;Gait belt;Brace/Splint  Ambulation - Level of Assistance: Contact guard assistance        Gait Abnormalities: Decreased step clearance        Base of Support: Narrowed                                        Stairs:  Number of Stairs Trained: 6  Stairs - Level of Assistance: Contact guard assistance  Rail Use: Right   Therapeutic Exercises:      Pain:  Pain Scale 1: Numeric (0 - 10)  Pain Intensity 1: 6  Pain Location 1: Back  Pain Orientation 1: Lower  Pain Description 1: Aching  Pain Intervention(s) 1: Medication (see MAR)  Activity Tolerance:   Good  Please refer to the flowsheet for vital signs taken during this treatment.   After treatment:   [X]  Patient left in no apparent distress sitting up in chair  [ ]  Patient left in no apparent distress in bed  [X]  Call bell left within reach  [X]  Nursing notified  [X]  Caregiver present  [ ]  Bed alarm activated      COMMUNICATION/COLLABORATION:   The patients plan of care was discussed with: Registered Nurse     Sherri Lima   Time Calculation: 27 mins

## 2017-08-25 NOTE — PROGRESS NOTES
Bedside and Verbal shift change report given to [address] (oncoming nurse) by Samuel Mercado (offgoing nurse). Report included the following information SBAR, Kardex, Intake/Output, MAR and Recent Results.

## 2017-08-31 NOTE — DISCHARGE SUMMARY
DISCHARGE SUMMARY     Patient: Estela Acosta                             Medical Record Number: [id number]                : 1950  Age: 75 y.o. Admit Date: 2017  Discharge Date: 2017  Admission Diagnosis: Lumbar stenosis [M48.06]  Discharge Diagnosis: Lumbar stenosis [M48.06]  Procedures: Procedure(s):  L3-L4 LAMINECTOMY, FUSION WITH INSTRUMENTATION, TLIF, BONEGRAFT  Surgeon: Ihsan Burroughs MD  Co-surgeon:   Assistants:   Anesthesia: General  Complications: None     History of Present Illness:  Etsela Acosta is a 75 y.o. male with a history of intractible low back and radiculopathy. Despite conservative management and after clinical and radiographic evaluation, it was determined that he suffered from lumbar stenosis  and lumbar spondylolisthesis and would benefit from Procedure(s):  L3-L4 LAMINECTOMY, FUSION WITH INSTRUMENTATION, TLIF, BONEGRAFT, which he consented to undergo after a discussion of the risks, benefits, alternatives, rehab concerns, and potential complications of surgery. Hospital Course:  Estela Acosta tolerated the procedure well. He was transferred  to the recovery room in stable condition. After a brief stay, the patient was then transferred to the Orthopedic floor. On postoperative day #1, the dressing was clean and dry and he was neurovascularly intact. The patient was afebrile and vital signs were stable. Calves were soft and non-tender bilaterally. Estela Acosta made excellent progress with physical therapy and was discharged to Home w/ Home Health in stable condition on postoperative day 4. He was provided with routine postoperative instructions and advised to follow up in my office in 3 weeks following discharge from the hospital.  He was given prescriptions for medication to control post-operative symptoms.     Discharge Medications:  Discharge Medication List as of 2017 12:04 PM      START taking these medications    Details   tapentadol (NUCYNTA) 50 mg tablet Take  mg by mouth every four (4) hours as needed. Max Daily Amount: 600 mg., Print, Disp-80 Tab, R-0      docusate sodium (COLACE) 100 mg capsule Take 1 Cap by mouth two (2) times a day for 90 days. , No Print, Disp-60 Cap, R-2      diazePAM (VALIUM) 5 mg tablet Take 1 Tab by mouth every eight (8) hours as needed (Spasms). Max Daily Amount: 15 mg., Print, Disp-20 Tab, R-0      naloxone (NARCAN) 4 mg/actuation spry Take as directed. Use 1 spray intranasally into 1 nostril. Use a new Narcan nasal spray for subsequent doses and administer into alternating nostrils. May repeat every 2 to 3 minutes as needed. , Print, Disp-2 Actuation(s), R-0         CONTINUE these medications which have CHANGED    Details   promethazine (PHENERGAN) 25 mg tablet Take 1 Tab by mouth every six (6) hours as needed for Nausea. , Print, Disp-30 Tab, R-0         CONTINUE these medications which have NOT CHANGED    Details   ezetimibe (ZETIA) 10 mg tablet Take  by mouth daily (after breakfast). , Historical Med      nebivolol (BYSTOLIC) 5 mg tablet Take  by mouth daily (after breakfast). , Historical Med      indapamide (LOZOL) 1.25 mg tablet Take  by mouth every morning., Historical Med      atorvastatin (LIPITOR) 40 mg tablet Take  by mouth nightly., Historical Med      allopurinol (ZYLOPRIM) 300 mg tablet Take  by mouth nightly., Historical Med      losartan (COZAAR) 100 mg tablet Take 100 mg by mouth daily.  AT [facility], 100 mg         STOP taking these medications       diclofenac EC (VOLTAREN) 75 mg EC tablet Comments:   Reason for Stopping:         aspirin delayed-release 81 mg tablet Comments:   Reason for Stopping:               JAIME Diane  8/25/2017  Orthopaedic Surgery  Physician Assistant to Dr. Joseph Boles

## 2017-08-31 NOTE — PROGRESS NOTES
ORTHOPAEDIC LUMBAR FUSION PROGRESS NOTE    NAME:     Cory Cardoza   :       1950   MRN:       [id number]   DATE:      2017    POD:    4 Days Post-Op  S/P:    Procedure(s):  L3-L4 LAMINECTOMY, FUSION WITH INSTRUMENTATION, TLIF, BONEGRAFT    SUBJECTIVE:    C/O back pain along surgical incision  No leg pain or numbness  Denies nausea/vomiting, chest pain, headache or shortness of breath  Pain controlled    Labs reviewed  Vitals reviewed. Afebrile, VSS    EXAM:  Dressings clean, dry and intact   Positive strength/ROM bilat lower ext.   Neuro intact to sensation  Calves, soft & nontender  BL LEs NVID      PLAN:  Continue PO pain medications  PT/OT, OOB w/ assist  Advance regular diet      [place], [address]  Orthopaedic Surgery  Physician Assistant to Dr. Neetu Cintron

## 2018-08-29 NOTE — PROGRESS NOTES
Discharge instructions reviewed with and copy given to patient along with prescriptions.  Patient verbalizes understanding of instructions with no questions offered at this time Theron

## 2018-09-10 ENCOUNTER — HOSPITAL ENCOUNTER (OUTPATIENT)
Dept: MRI IMAGING | Age: 68
Discharge: HOME | End: 2018-09-10
Payer: MEDICARE

## 2018-09-10 DIAGNOSIS — M23.203 DEGENERATIVE TEAR OF MEDIAL MENISCUS OF RIGHT KNEE: ICD-10-CM

## 2018-09-10 PROCEDURE — 73721 MRI JNT OF LWR EXTRE W/O DYE: CPT

## 2022-03-18 PROBLEM — M48.062 LUMBAR STENOSIS WITH NEUROGENIC CLAUDICATION: Status: ACTIVE | Noted: 2017-08-21

## 2022-04-14 ENCOUNTER — TRANSCRIBE ORDER (OUTPATIENT)
Dept: SCHEDULING | Age: 72
End: 2022-04-14

## 2022-04-14 DIAGNOSIS — M25.531 RIGHT WRIST PAIN: ICD-10-CM

## 2022-04-14 DIAGNOSIS — M79.89 SWELLING OF LIMB: Primary | ICD-10-CM

## 2023-05-17 RX ORDER — LOSARTAN POTASSIUM 100 MG/1
100 TABLET ORAL DAILY
COMMUNITY

## 2023-05-17 RX ORDER — DIAZEPAM 5 MG/1
5 TABLET ORAL EVERY 8 HOURS PRN
COMMUNITY
Start: 2017-08-25

## 2023-05-17 RX ORDER — ALLOPURINOL 300 MG/1
TABLET ORAL
COMMUNITY

## 2023-05-17 RX ORDER — PROMETHAZINE HYDROCHLORIDE 25 MG/1
25 TABLET ORAL EVERY 6 HOURS PRN
COMMUNITY
Start: 2017-08-25

## 2023-05-17 RX ORDER — INDAPAMIDE 1.25 MG/1
TABLET, FILM COATED ORAL
COMMUNITY

## 2023-05-17 RX ORDER — ATORVASTATIN CALCIUM 40 MG/1
TABLET, FILM COATED ORAL
COMMUNITY

## 2023-05-17 RX ORDER — NALOXONE HYDROCHLORIDE 4 MG/.1ML
SPRAY NASAL
COMMUNITY
Start: 2017-08-25

## 2023-05-17 RX ORDER — NEBIVOLOL 5 MG/1
TABLET ORAL
COMMUNITY

## 2023-05-17 RX ORDER — EZETIMIBE 10 MG/1
TABLET ORAL
COMMUNITY

## 2023-06-02 ENCOUNTER — TRANSCRIBE ORDER (OUTPATIENT)
Dept: SCHEDULING | Age: 73
End: 2023-06-02

## 2023-06-07 ENCOUNTER — HOSPITAL ENCOUNTER (OUTPATIENT)
Facility: HOSPITAL | Age: 73
Discharge: HOME | End: 2023-06-10
Payer: MEDICARE

## 2023-06-07 DIAGNOSIS — I73.9 PERIPHERAL VASCULAR DISEASE (HCC): ICD-10-CM

## 2023-06-07 LAB — CREAT BLD-MCNC: 1.1 MG/DL (ref 0.6–1.3)

## 2023-06-07 PROCEDURE — 75635 CT ANGIO ABDOMINAL ARTERIES: CPT

## 2023-06-07 PROCEDURE — 6360000004 HC RX CONTRAST MEDICATION

## 2023-06-07 PROCEDURE — 82565 ASSAY OF CREATININE: CPT

## 2023-06-07 RX ADMIN — IOPAMIDOL 100 ML: 755 INJECTION, SOLUTION INTRAVENOUS at 09:23

## 2024-02-21 ENCOUNTER — HOSPITAL ENCOUNTER (OUTPATIENT)
Facility: HOSPITAL | Age: 74
Discharge: HOME | End: 2024-02-24
Attending: ORTHOPAEDIC SURGERY
Payer: MEDICARE

## 2024-02-21 DIAGNOSIS — M51.36 LUMBAR DEGENERATIVE DISC DISEASE: ICD-10-CM

## 2024-02-21 DIAGNOSIS — M54.50 LUMBAR SPINE PAIN: ICD-10-CM

## 2024-02-21 DIAGNOSIS — M62.830 BACK MUSCLE SPASM: ICD-10-CM

## 2024-02-21 DIAGNOSIS — M47.816 FACET ARTHROPATHY, LUMBAR: ICD-10-CM

## 2024-02-21 DIAGNOSIS — M54.50 LUMBAR PAIN: ICD-10-CM

## 2024-02-21 PROCEDURE — 72148 MRI LUMBAR SPINE W/O DYE: CPT

## (undated) DEVICE — DRAIN KT WND 10FR RND 400ML --

## (undated) DEVICE — BIPOLAR FORCEPS CORD,BANANA LEADS: Brand: VALLEYLAB

## (undated) DEVICE — TIP CLEANER: Brand: VALLEYLAB

## (undated) DEVICE — DRAPE,REIN 53X77,STERILE: Brand: MEDLINE

## (undated) DEVICE — (D)PREP SKN CHLRAPRP APPL 26ML -- CONVERT TO ITEM 371833

## (undated) DEVICE — WATERPROOF, BACTERIA PROOF DRESSING WITH ABSORBENT SEE THROUGH PAD: Brand: OPSITE POST-OP VISIBLE 25X10CM CTN 20

## (undated) DEVICE — 3M™ TEGADERM™ TRANSPARENT FILM DRESSING FRAME STYLE, 1626, 4 IN X 4-3/4 IN (10 CM X 12 CM), 50/CT 4CT/CASE: Brand: 3M™ TEGADERM™

## (undated) DEVICE — INTENDED FOR TISSUE SEPARATION, AND OTHER PROCEDURES THAT REQUIRE A SHARP SURGICAL BLADE TO PUNCTURE OR CUT.: Brand: BARD-PARKER ® CARBON RIB-BACK BLADES

## (undated) DEVICE — DRAPE XR C ARM 41X74IN LF --

## (undated) DEVICE — STERILE POLYISOPRENE POWDER-FREE SURGICAL GLOVES: Brand: PROTEXIS

## (undated) DEVICE — DEVON™ KNEE AND BODY STRAP 60" X 3" (1.5 M X 7.6 CM): Brand: DEVON

## (undated) DEVICE — SUTURE ABSORBABLE BRAIDED 2-0 CT-1 27 IN UD VICRYL J259H

## (undated) DEVICE — 1010 S-DRAPE TOWEL DRAPE 10/BX: Brand: STERI-DRAPE™

## (undated) DEVICE — NEEDLE HYPO 22GA L1.5IN BLK S STL HUB POLYPR SHLD REG BVL

## (undated) DEVICE — SUTURE VCRL SZ 0 L36IN ABSRB UD L36MM CT-1 1/2 CIR J946H

## (undated) DEVICE — DRSG PATCH ANTIMIC 1INX4.0MM -- CONVERT TO ITEM 356053

## (undated) DEVICE — DRAPE,UTILITY,TAPE,15X26,STERILE: Brand: MEDLINE

## (undated) DEVICE — SUTURE MCRYL SZ 3-0 L27IN ABSRB UD L24MM PS-1 3/8 CIR PRIM Y936H

## (undated) DEVICE — MEDI-VAC NON-CONDUCTIVE SUCTION TUBING: Brand: CARDINAL HEALTH

## (undated) DEVICE — ELECTRODE BLDE L4IN NONINSULATED EDGE

## (undated) DEVICE — SYRINGE MED 20ML STD CLR PLAS LUERLOCK TIP N CTRL DISP

## (undated) DEVICE — Device

## (undated) DEVICE — CODMAN® SURGICAL PATTIES 3/4" X 3/4" (1.91CM X 1.91CM): Brand: CODMAN®

## (undated) DEVICE — STOPCOCK IV 3W --

## (undated) DEVICE — (D)SYR 10ML 1/5ML GRAD NSAF -- PKGING CHANGE USE ITEM 338027

## (undated) DEVICE — CATHETER IV 14GA L1.25IN TEF STR HUB INTROCAN SFTY

## (undated) DEVICE — SOLUTION IV 1000ML 0.9% SOD CHL

## (undated) DEVICE — NDL SPNE QNCKE 18GX3.5IN LF --

## (undated) DEVICE — ACCY PA100-A LEGEND LUB/DIFFUSER 4 PACK: Brand: MIDAS REX®

## (undated) DEVICE — SUTURE VCRL SZ 0 L27IN ABSRB UD L26MM CT-2 1/2 CIR J270H

## (undated) DEVICE — KENDALL SCD EXPRESS SLEEVES, KNEE LENGTH, MEDIUM: Brand: KENDALL SCD

## (undated) DEVICE — STERILE POLYISOPRENE POWDER-FREE SURGICAL GLOVES WITH EMOLLIENT COATING: Brand: PROTEXIS

## (undated) DEVICE — SOLUTION IRRIG 1000ML H2O STRL BLT

## (undated) DEVICE — TOOL 14MH30 LEGEND 14CM 3MM: Brand: MIDAS REX ™

## (undated) DEVICE — BONE WAX WHITE: Brand: BONE WAX WHITE

## (undated) DEVICE — LAMINECTOMY RICHMOND-LF: Brand: MEDLINE INDUSTRIES, INC.

## (undated) DEVICE — MAGNETIC DRAPE: Brand: DEVON

## (undated) DEVICE — 3M™ TEGADERM™ TRANSPARENT FILM DRESSING FRAME STYLE, 1624W, 2-3/8 IN X 2-3/4 IN (6 CM X 7 CM), 100/CT 4CT/CASE: Brand: 3M™ TEGADERM™

## (undated) DEVICE — KIT POS W/ FOAM ARM CRADL SHEARGUARD CHST PD CVR FOR SPNL

## (undated) DEVICE — COVER,TABLE,60X90,STERILE: Brand: MEDLINE

## (undated) DEVICE — THE MILL DISPOSABLE - MEDIUM

## (undated) DEVICE — TRAY CATH OD16FR SIL URIN M STATLOK STBL DEV SURSTP

## (undated) DEVICE — INFECTION CONTROL KIT SYS

## (undated) DEVICE — AMD ANTIMICROBIAL DRAIN SPONGES, 6 PLY, 0.2% POLYHEXAMETHYLENE BIGUANIDE HCI (PHMB): Brand: EXCILON

## (undated) DEVICE — SYR LR LCK 1ML GRAD NSAF 30ML --

## (undated) DEVICE — SUTURE VCRL SZ 1 L36IN ABSRB UD L36MM CT-1 1/2 CIR J947H

## (undated) DEVICE — ASTOUND STANDARD SURGICAL GOWN, XL: Brand: CONVERTORS

## (undated) DEVICE — SUTURE STRATAFIX SPRL SZ 1 L14IN ABSRB VLT L48CM CTX 1/2 SXPD2B405

## (undated) DEVICE — Z CONVERTED USE 2271043 CONTAINER SPEC COLL 4OZ SCR ON LID PEEL PCH

## (undated) DEVICE — 3000CC GUARDIAN II: Brand: GUARDIAN

## (undated) DEVICE — SYSTEM SKIN CLSR 22CM DERMBND PRINEO